# Patient Record
Sex: FEMALE | Race: BLACK OR AFRICAN AMERICAN | NOT HISPANIC OR LATINO | Employment: OTHER | URBAN - METROPOLITAN AREA
[De-identification: names, ages, dates, MRNs, and addresses within clinical notes are randomized per-mention and may not be internally consistent; named-entity substitution may affect disease eponyms.]

---

## 2024-07-08 ENCOUNTER — HOSPITAL ENCOUNTER (OUTPATIENT)
Facility: HOSPITAL | Age: 79
Setting detail: OBSERVATION
Discharge: HOME | End: 2024-07-08
Attending: EMERGENCY MEDICINE | Admitting: INTERNAL MEDICINE
Payer: MEDICARE

## 2024-07-08 ENCOUNTER — APPOINTMENT (OUTPATIENT)
Dept: DIALYSIS | Facility: HOSPITAL | Age: 79
End: 2024-07-08
Payer: MEDICARE

## 2024-07-08 ENCOUNTER — APPOINTMENT (OUTPATIENT)
Dept: CARDIOLOGY | Facility: HOSPITAL | Age: 79
End: 2024-07-08
Payer: MEDICARE

## 2024-07-08 VITALS
TEMPERATURE: 97.2 F | DIASTOLIC BLOOD PRESSURE: 62 MMHG | OXYGEN SATURATION: 97 % | WEIGHT: 150 LBS | BODY MASS INDEX: 26.58 KG/M2 | SYSTOLIC BLOOD PRESSURE: 153 MMHG | HEIGHT: 63 IN | RESPIRATION RATE: 16 BRPM | HEART RATE: 74 BPM

## 2024-07-08 DIAGNOSIS — E83.39 HYPERPHOSPHATEMIA: ICD-10-CM

## 2024-07-08 DIAGNOSIS — N17.9 ACUTE RENAL FAILURE SUPERIMPOSED ON CHRONIC KIDNEY DISEASE, ON CHRONIC DIALYSIS, UNSPECIFIED ACUTE RENAL FAILURE TYPE (MULTI): Primary | ICD-10-CM

## 2024-07-08 DIAGNOSIS — E87.5 HYPERKALEMIA: ICD-10-CM

## 2024-07-08 DIAGNOSIS — N18.6 ACUTE RENAL FAILURE SUPERIMPOSED ON CHRONIC KIDNEY DISEASE, ON CHRONIC DIALYSIS, UNSPECIFIED ACUTE RENAL FAILURE TYPE (MULTI): Primary | ICD-10-CM

## 2024-07-08 DIAGNOSIS — Z99.2 ACUTE RENAL FAILURE SUPERIMPOSED ON CHRONIC KIDNEY DISEASE, ON CHRONIC DIALYSIS, UNSPECIFIED ACUTE RENAL FAILURE TYPE (MULTI): Primary | ICD-10-CM

## 2024-07-08 PROBLEM — E87.6 HYPOKALEMIA: Status: ACTIVE | Noted: 2024-07-08

## 2024-07-08 PROBLEM — H47.291: Status: ACTIVE | Noted: 2024-07-08

## 2024-07-08 PROBLEM — H21.561 AFFERENT PUPILLARY DEFECT OF RIGHT EYE: Status: ACTIVE | Noted: 2024-07-08

## 2024-07-08 PROBLEM — M85.80 OSTEOPENIA WITH HIGH RISK OF FRACTURE: Status: ACTIVE | Noted: 2019-02-27

## 2024-07-08 PROBLEM — E11.3593 PROLIFERATIVE DIABETIC RETINOPATHY OF BOTH EYES ASSOCIATED WITH TYPE 2 DIABETES MELLITUS (MULTI): Status: ACTIVE | Noted: 2023-07-20

## 2024-07-08 PROBLEM — R06.09 DYSPNEA ON EXERTION: Status: ACTIVE | Noted: 2024-07-08

## 2024-07-08 PROBLEM — E11.65 TYPE 2 DIABETES MELLITUS WITH HYPERGLYCEMIA (MULTI): Status: ACTIVE | Noted: 2019-01-22

## 2024-07-08 PROBLEM — M51.36 DDD (DEGENERATIVE DISC DISEASE), LUMBAR: Status: ACTIVE | Noted: 2023-06-13

## 2024-07-08 PROBLEM — R79.89 INCREASED PTH LEVEL: Status: ACTIVE | Noted: 2020-11-03

## 2024-07-08 PROBLEM — Z96.1 PSEUDOPHAKIA, BOTH EYES: Status: ACTIVE | Noted: 2024-07-08

## 2024-07-08 PROBLEM — R29.898 LEG WEAKNESS: Status: ACTIVE | Noted: 2022-12-01

## 2024-07-08 PROBLEM — E55.9 VITAMIN D DEFICIENCY: Status: ACTIVE | Noted: 2024-07-08

## 2024-07-08 PROBLEM — R32 URINARY INCONTINENCE: Status: ACTIVE | Noted: 2018-12-04

## 2024-07-08 PROBLEM — F33.0 MILD RECURRENT MAJOR DEPRESSION (CMS-HCC): Status: ACTIVE | Noted: 2020-11-03

## 2024-07-08 PROBLEM — M51.369 DDD (DEGENERATIVE DISC DISEASE), LUMBAR: Status: ACTIVE | Noted: 2023-06-13

## 2024-07-08 PROBLEM — E11.3592: Status: ACTIVE | Noted: 2024-07-08

## 2024-07-08 PROBLEM — L29.9 ITCHY SKIN: Status: ACTIVE | Noted: 2022-04-05

## 2024-07-08 PROBLEM — N19 RENAL FAILURE: Status: ACTIVE | Noted: 2020-11-03

## 2024-07-08 PROBLEM — I10 BENIGN ESSENTIAL HYPERTENSION: Status: ACTIVE | Noted: 2024-02-07

## 2024-07-08 PROBLEM — R41.3 MEMORY LOSS: Status: ACTIVE | Noted: 2022-12-01

## 2024-07-08 PROBLEM — E78.5 HYPERLIPIDEMIA: Status: ACTIVE | Noted: 2024-07-08

## 2024-07-08 PROBLEM — R63.8 DIETARY INDISCRETION: Status: ACTIVE | Noted: 2019-01-22

## 2024-07-08 PROBLEM — R26.89 BALANCE DISORDER: Status: ACTIVE | Noted: 2022-12-01

## 2024-07-08 PROBLEM — E11.9 DIABETES MELLITUS (MULTI): Status: ACTIVE | Noted: 2024-07-08

## 2024-07-08 PROBLEM — N25.81 SECONDARY HYPERPARATHYROIDISM OF RENAL ORIGIN (MULTI): Status: ACTIVE | Noted: 2022-04-05

## 2024-07-08 PROBLEM — F03.90 DEMENTIA (MULTI): Status: ACTIVE | Noted: 2023-06-13

## 2024-07-08 PROBLEM — M48.061 SPINAL STENOSIS AT L4-L5 LEVEL: Status: ACTIVE | Noted: 2023-06-13

## 2024-07-08 PROBLEM — C90.00: Status: ACTIVE | Noted: 2019-12-04

## 2024-07-08 PROBLEM — E13.9 DIABETES 1.5, MANAGED AS TYPE 2 (MULTI): Chronic | Status: ACTIVE | Noted: 2024-02-07

## 2024-07-08 PROBLEM — R60.0 EDEMA LEG: Status: ACTIVE | Noted: 2020-11-04

## 2024-07-08 PROBLEM — D64.9 ANEMIA: Status: ACTIVE | Noted: 2019-10-29

## 2024-07-08 PROBLEM — H40.9 GLAUCOMA: Status: ACTIVE | Noted: 2024-07-08

## 2024-07-08 PROBLEM — Z91.199 MEDICAL NON-COMPLIANCE: Status: ACTIVE | Noted: 2020-11-03

## 2024-07-08 PROBLEM — I10 HYPERTENSION: Status: ACTIVE | Noted: 2024-07-08

## 2024-07-08 PROBLEM — N18.9 CKD (CHRONIC KIDNEY DISEASE): Status: ACTIVE | Noted: 2024-07-08

## 2024-07-08 PROBLEM — E11.319 DIABETIC RETINOPATHY (MULTI): Status: ACTIVE | Noted: 2021-09-29

## 2024-07-08 LAB
ALBUMIN SERPL BCP-MCNC: 3 G/DL (ref 3.4–5)
ALP SERPL-CCNC: 54 U/L (ref 33–136)
ALT SERPL W P-5'-P-CCNC: 11 U/L (ref 7–45)
ANION GAP BLDV CALCULATED.4IONS-SCNC: 14 MMOL/L (ref 10–25)
ANION GAP SERPL CALC-SCNC: 16 MMOL/L (ref 10–20)
AST SERPL W P-5'-P-CCNC: 14 U/L (ref 9–39)
BASE EXCESS BLDV CALC-SCNC: -4.6 MMOL/L (ref -2–3)
BASOPHILS # BLD AUTO: 0.02 X10*3/UL (ref 0–0.1)
BASOPHILS NFR BLD AUTO: 0.3 %
BILIRUB SERPL-MCNC: 0.5 MG/DL (ref 0–1.2)
BODY TEMPERATURE: 37 DEGREES CELSIUS
BUN SERPL-MCNC: 69 MG/DL (ref 6–23)
CA-I BLDV-SCNC: 1.15 MMOL/L (ref 1.1–1.33)
CALCIUM SERPL-MCNC: 8.3 MG/DL (ref 8.6–10.3)
CHLORIDE BLDV-SCNC: 107 MMOL/L (ref 98–107)
CHLORIDE SERPL-SCNC: 104 MMOL/L (ref 98–107)
CO2 SERPL-SCNC: 21 MMOL/L (ref 21–32)
CREAT SERPL-MCNC: 14.31 MG/DL (ref 0.5–1.05)
EGFRCR SERPLBLD CKD-EPI 2021: 2 ML/MIN/1.73M*2
EOSINOPHIL # BLD AUTO: 0.03 X10*3/UL (ref 0–0.4)
EOSINOPHIL NFR BLD AUTO: 0.4 %
ERYTHROCYTE [DISTWIDTH] IN BLOOD BY AUTOMATED COUNT: 16.3 % (ref 11.5–14.5)
GLUCOSE BLD MANUAL STRIP-MCNC: 83 MG/DL (ref 74–99)
GLUCOSE BLDV-MCNC: 86 MG/DL (ref 74–99)
GLUCOSE SERPL-MCNC: 79 MG/DL (ref 74–99)
HBV SURFACE AB SER-ACNC: >1000 MIU/ML
HBV SURFACE AG SERPL QL IA: NONREACTIVE
HCO3 BLDV-SCNC: 20.3 MMOL/L (ref 22–26)
HCT VFR BLD AUTO: 32.4 % (ref 36–46)
HCT VFR BLD EST: 35 % (ref 36–46)
HGB BLD-MCNC: 11.2 G/DL (ref 12–16)
HGB BLDV-MCNC: 11.6 G/DL (ref 12–16)
IMM GRANULOCYTES # BLD AUTO: 0.04 X10*3/UL (ref 0–0.5)
IMM GRANULOCYTES NFR BLD AUTO: 0.5 % (ref 0–0.9)
INHALED O2 CONCENTRATION: 21 %
INR PPP: 1 (ref 0.9–1.1)
LACTATE BLDV-SCNC: 1.1 MMOL/L (ref 0.4–2)
LYMPHOCYTES # BLD AUTO: 0.78 X10*3/UL (ref 0.8–3)
LYMPHOCYTES NFR BLD AUTO: 10.3 %
MAGNESIUM SERPL-MCNC: 2.3 MG/DL (ref 1.6–2.4)
MCH RBC QN AUTO: 29 PG (ref 26–34)
MCHC RBC AUTO-ENTMCNC: 34.6 G/DL (ref 32–36)
MCV RBC AUTO: 84 FL (ref 80–100)
MONOCYTES # BLD AUTO: 0.55 X10*3/UL (ref 0.05–0.8)
MONOCYTES NFR BLD AUTO: 7.3 %
NEUTROPHILS # BLD AUTO: 6.16 X10*3/UL (ref 1.6–5.5)
NEUTROPHILS NFR BLD AUTO: 81.2 %
NRBC BLD-RTO: 0 /100 WBCS (ref 0–0)
OXYHGB MFR BLDV: 85 % (ref 45–75)
PCO2 BLDV: 36 MM HG (ref 41–51)
PH BLDV: 7.36 PH (ref 7.33–7.43)
PHOSPHATE SERPL-MCNC: 7 MG/DL (ref 2.5–4.9)
PLATELET # BLD AUTO: 123 X10*3/UL (ref 150–450)
PO2 BLDV: 60 MM HG (ref 35–45)
POTASSIUM BLDV-SCNC: 6.7 MMOL/L (ref 3.5–5.3)
POTASSIUM SERPL-SCNC: 5.9 MMOL/L (ref 3.5–5.3)
PROT SERPL-MCNC: 8.8 G/DL (ref 6.4–8.2)
PROTHROMBIN TIME: 11.7 SECONDS (ref 9.8–12.8)
RBC # BLD AUTO: 3.86 X10*6/UL (ref 4–5.2)
SAO2 % BLDV: 87 % (ref 45–75)
SODIUM BLDV-SCNC: 135 MMOL/L (ref 136–145)
SODIUM SERPL-SCNC: 135 MMOL/L (ref 136–145)
WBC # BLD AUTO: 7.6 X10*3/UL (ref 4.4–11.3)

## 2024-07-08 PROCEDURE — 84132 ASSAY OF SERUM POTASSIUM: CPT | Mod: 59 | Performed by: EMERGENCY MEDICINE

## 2024-07-08 PROCEDURE — 80053 COMPREHEN METABOLIC PANEL: CPT | Performed by: PHYSICIAN ASSISTANT

## 2024-07-08 PROCEDURE — 36415 COLL VENOUS BLD VENIPUNCTURE: CPT | Performed by: PHYSICIAN ASSISTANT

## 2024-07-08 PROCEDURE — 85610 PROTHROMBIN TIME: CPT | Performed by: PHYSICIAN ASSISTANT

## 2024-07-08 PROCEDURE — 2500000004 HC RX 250 GENERAL PHARMACY W/ HCPCS (ALT 636 FOR OP/ED): Mod: JZ | Performed by: PHYSICIAN ASSISTANT

## 2024-07-08 PROCEDURE — 93005 ELECTROCARDIOGRAM TRACING: CPT

## 2024-07-08 PROCEDURE — 87340 HEPATITIS B SURFACE AG IA: CPT | Mod: AHULAB | Performed by: INTERNAL MEDICINE

## 2024-07-08 PROCEDURE — 82947 ASSAY GLUCOSE BLOOD QUANT: CPT

## 2024-07-08 PROCEDURE — 90937 HEMODIALYSIS REPEATED EVAL: CPT

## 2024-07-08 PROCEDURE — G0378 HOSPITAL OBSERVATION PER HR: HCPCS

## 2024-07-08 PROCEDURE — 8010000001 HC DIALYSIS - HEMODIALYSIS PER DAY

## 2024-07-08 PROCEDURE — 84100 ASSAY OF PHOSPHORUS: CPT | Performed by: PHYSICIAN ASSISTANT

## 2024-07-08 PROCEDURE — 96374 THER/PROPH/DIAG INJ IV PUSH: CPT | Mod: 59

## 2024-07-08 PROCEDURE — 99234 HOSP IP/OBS SM DT SF/LOW 45: CPT | Performed by: INTERNAL MEDICINE

## 2024-07-08 PROCEDURE — 36415 COLL VENOUS BLD VENIPUNCTURE: CPT | Performed by: EMERGENCY MEDICINE

## 2024-07-08 PROCEDURE — 85025 COMPLETE CBC W/AUTO DIFF WBC: CPT | Performed by: PHYSICIAN ASSISTANT

## 2024-07-08 PROCEDURE — 99285 EMERGENCY DEPT VISIT HI MDM: CPT | Mod: 25

## 2024-07-08 PROCEDURE — 86706 HEP B SURFACE ANTIBODY: CPT | Mod: AHULAB | Performed by: INTERNAL MEDICINE

## 2024-07-08 PROCEDURE — 83735 ASSAY OF MAGNESIUM: CPT | Performed by: PHYSICIAN ASSISTANT

## 2024-07-08 RX ORDER — ATORVASTATIN CALCIUM 80 MG/1
80 TABLET, FILM COATED ORAL NIGHTLY
Status: DISCONTINUED | OUTPATIENT
Start: 2024-07-08 | End: 2024-07-08 | Stop reason: HOSPADM

## 2024-07-08 RX ORDER — HEPARIN SODIUM 5000 [USP'U]/ML
5000 INJECTION, SOLUTION INTRAVENOUS; SUBCUTANEOUS EVERY 8 HOURS SCHEDULED
Status: DISCONTINUED | OUTPATIENT
Start: 2024-07-09 | End: 2024-07-08 | Stop reason: HOSPADM

## 2024-07-08 RX ORDER — ONDANSETRON 4 MG/1
4 TABLET, FILM COATED ORAL EVERY 8 HOURS PRN
Status: DISCONTINUED | OUTPATIENT
Start: 2024-07-08 | End: 2024-07-08 | Stop reason: HOSPADM

## 2024-07-08 RX ORDER — ACETAMINOPHEN 325 MG/1
975 TABLET ORAL EVERY 8 HOURS PRN
Status: DISCONTINUED | OUTPATIENT
Start: 2024-07-08 | End: 2024-07-08 | Stop reason: HOSPADM

## 2024-07-08 RX ORDER — NIFEDIPINE 30 MG/1
30 TABLET, FILM COATED, EXTENDED RELEASE ORAL
Status: DISCONTINUED | OUTPATIENT
Start: 2024-07-09 | End: 2024-07-08

## 2024-07-08 RX ORDER — ACETAMINOPHEN 650 MG/1
650 SUPPOSITORY RECTAL EVERY 4 HOURS PRN
Status: DISCONTINUED | OUTPATIENT
Start: 2024-07-08 | End: 2024-07-08 | Stop reason: HOSPADM

## 2024-07-08 RX ORDER — ATORVASTATIN CALCIUM 80 MG/1
80 TABLET, FILM COATED ORAL DAILY
COMMUNITY
Start: 2024-01-24

## 2024-07-08 RX ORDER — POLYETHYLENE GLYCOL 3350 17 G/17G
17 POWDER, FOR SOLUTION ORAL DAILY
Status: DISCONTINUED | OUTPATIENT
Start: 2024-07-08 | End: 2024-07-08 | Stop reason: HOSPADM

## 2024-07-08 RX ORDER — SEVELAMER CARBONATE 800 MG/1
800 TABLET, FILM COATED ORAL DAILY
COMMUNITY
Start: 2023-10-09

## 2024-07-08 RX ORDER — ONDANSETRON HYDROCHLORIDE 2 MG/ML
4 INJECTION, SOLUTION INTRAVENOUS EVERY 8 HOURS PRN
Status: DISCONTINUED | OUTPATIENT
Start: 2024-07-08 | End: 2024-07-08 | Stop reason: HOSPADM

## 2024-07-08 RX ORDER — ERGOCALCIFEROL 1.25 MG/1
CAPSULE ORAL
COMMUNITY
Start: 2015-01-12 | End: 2024-07-08 | Stop reason: ENTERED-IN-ERROR

## 2024-07-08 RX ORDER — HYDRALAZINE HYDROCHLORIDE 25 MG/1
100 TABLET, FILM COATED ORAL 2 TIMES DAILY
Status: DISCONTINUED | OUTPATIENT
Start: 2024-07-08 | End: 2024-07-08

## 2024-07-08 RX ORDER — NIFEDIPINE 30 MG/1
30 TABLET, FILM COATED, EXTENDED RELEASE ORAL DAILY
COMMUNITY
Start: 2024-06-27

## 2024-07-08 RX ORDER — CALCIUM GLUCONATE 20 MG/ML
2 INJECTION, SOLUTION INTRAVENOUS ONCE
Status: COMPLETED | OUTPATIENT
Start: 2024-07-08 | End: 2024-07-08

## 2024-07-08 RX ORDER — FERROUS SULFATE 325(65) MG
TABLET ORAL
COMMUNITY
Start: 2018-03-15 | End: 2024-07-08 | Stop reason: ENTERED-IN-ERROR

## 2024-07-08 RX ORDER — TALC
6 POWDER (GRAM) TOPICAL NIGHTLY
Status: DISCONTINUED | OUTPATIENT
Start: 2024-07-08 | End: 2024-07-08 | Stop reason: HOSPADM

## 2024-07-08 RX ORDER — ACETAMINOPHEN 160 MG/5ML
650 SOLUTION ORAL EVERY 6 HOURS PRN
Status: DISCONTINUED | OUTPATIENT
Start: 2024-07-08 | End: 2024-07-08 | Stop reason: HOSPADM

## 2024-07-08 RX ORDER — METOPROLOL SUCCINATE 50 MG/1
200 TABLET, EXTENDED RELEASE ORAL DAILY
Status: DISCONTINUED | OUTPATIENT
Start: 2024-07-08 | End: 2024-07-08

## 2024-07-08 RX ORDER — HYDRALAZINE HYDROCHLORIDE 100 MG/1
200 TABLET, FILM COATED ORAL DAILY
COMMUNITY
Start: 2024-06-05

## 2024-07-08 RX ORDER — METOPROLOL SUCCINATE 200 MG/1
200 TABLET, EXTENDED RELEASE ORAL DAILY
COMMUNITY
Start: 2024-06-27

## 2024-07-08 RX ORDER — ACETAMINOPHEN 500 MG
1-2 TABLET ORAL EVERY 6 HOURS PRN
COMMUNITY

## 2024-07-08 SDOH — SOCIAL STABILITY: SOCIAL INSECURITY: DO YOU FEEL ANYONE HAS EXPLOITED OR TAKEN ADVANTAGE OF YOU FINANCIALLY OR OF YOUR PERSONAL PROPERTY?: NO

## 2024-07-08 SDOH — SOCIAL STABILITY: SOCIAL INSECURITY: DO YOU FEEL UNSAFE GOING BACK TO THE PLACE WHERE YOU ARE LIVING?: NO

## 2024-07-08 SDOH — SOCIAL STABILITY: SOCIAL INSECURITY: HAS ANYONE EVER THREATENED TO HURT YOUR FAMILY OR YOUR PETS?: NO

## 2024-07-08 SDOH — SOCIAL STABILITY: SOCIAL INSECURITY: HAVE YOU HAD ANY THOUGHTS OF HARMING ANYONE ELSE?: NO

## 2024-07-08 SDOH — SOCIAL STABILITY: SOCIAL INSECURITY: HAVE YOU HAD THOUGHTS OF HARMING ANYONE ELSE?: NO

## 2024-07-08 SDOH — SOCIAL STABILITY: SOCIAL INSECURITY: ARE THERE ANY APPARENT SIGNS OF INJURIES/BEHAVIORS THAT COULD BE RELATED TO ABUSE/NEGLECT?: NO

## 2024-07-08 SDOH — SOCIAL STABILITY: SOCIAL INSECURITY: WERE YOU ABLE TO COMPLETE ALL THE BEHAVIORAL HEALTH SCREENINGS?: YES

## 2024-07-08 SDOH — SOCIAL STABILITY: SOCIAL INSECURITY: DOES ANYONE TRY TO KEEP YOU FROM HAVING/CONTACTING OTHER FRIENDS OR DOING THINGS OUTSIDE YOUR HOME?: NO

## 2024-07-08 SDOH — SOCIAL STABILITY: SOCIAL INSECURITY: ABUSE: ADULT

## 2024-07-08 SDOH — SOCIAL STABILITY: SOCIAL INSECURITY: ARE YOU OR HAVE YOU BEEN THREATENED OR ABUSED PHYSICALLY, EMOTIONALLY, OR SEXUALLY BY ANYONE?: NO

## 2024-07-08 ASSESSMENT — COGNITIVE AND FUNCTIONAL STATUS - GENERAL
DRESSING REGULAR LOWER BODY CLOTHING: A LITTLE
DAILY ACTIVITIY SCORE: 21
HELP NEEDED FOR BATHING: A LITTLE
DRESSING REGULAR UPPER BODY CLOTHING: A LITTLE
MOBILITY SCORE: 24
PATIENT BASELINE BEDBOUND: NO

## 2024-07-08 ASSESSMENT — ACTIVITIES OF DAILY LIVING (ADL)
PATIENT'S MEMORY ADEQUATE TO SAFELY COMPLETE DAILY ACTIVITIES?: YES
HEARING - RIGHT EAR: FUNCTIONAL
ADEQUATE_TO_COMPLETE_ADL: YES
DRESSING YOURSELF: NEEDS ASSISTANCE
HEARING - LEFT EAR: FUNCTIONAL
JUDGMENT_ADEQUATE_SAFELY_COMPLETE_DAILY_ACTIVITIES: YES
TOILETING: INDEPENDENT
LACK_OF_TRANSPORTATION: NO
ASSISTIVE_DEVICE: EYEGLASSES;WALKER
GROOMING: INDEPENDENT
FEEDING YOURSELF: INDEPENDENT
WALKS IN HOME: INDEPENDENT
BATHING: NEEDS ASSISTANCE

## 2024-07-08 ASSESSMENT — PAIN SCALES - GENERAL
PAINLEVEL_OUTOF10: 8
PAINLEVEL_OUTOF10: 0 - NO PAIN
PAINLEVEL_OUTOF10: 0 - NO PAIN

## 2024-07-08 ASSESSMENT — LIFESTYLE VARIABLES
AUDIT-C TOTAL SCORE: 1
SUBSTANCE_ABUSE_PAST_12_MONTHS: NO
HOW OFTEN DO YOU HAVE A DRINK CONTAINING ALCOHOL: MONTHLY OR LESS
AUDIT-C TOTAL SCORE: 1
SKIP TO QUESTIONS 9-10: 1
HOW OFTEN DO YOU HAVE 6 OR MORE DRINKS ON ONE OCCASION: NEVER
PRESCIPTION_ABUSE_PAST_12_MONTHS: NO
HOW MANY STANDARD DRINKS CONTAINING ALCOHOL DO YOU HAVE ON A TYPICAL DAY: 1 OR 2

## 2024-07-08 ASSESSMENT — PATIENT HEALTH QUESTIONNAIRE - PHQ9
1. LITTLE INTEREST OR PLEASURE IN DOING THINGS: SEVERAL DAYS
SUM OF ALL RESPONSES TO PHQ9 QUESTIONS 1 & 2: 2
2. FEELING DOWN, DEPRESSED OR HOPELESS: SEVERAL DAYS

## 2024-07-08 ASSESSMENT — COLUMBIA-SUICIDE SEVERITY RATING SCALE - C-SSRS
2. HAVE YOU ACTUALLY HAD ANY THOUGHTS OF KILLING YOURSELF?: NO
1. IN THE PAST MONTH, HAVE YOU WISHED YOU WERE DEAD OR WISHED YOU COULD GO TO SLEEP AND NOT WAKE UP?: NO
6. HAVE YOU EVER DONE ANYTHING, STARTED TO DO ANYTHING, OR PREPARED TO DO ANYTHING TO END YOUR LIFE?: NO

## 2024-07-08 ASSESSMENT — PAIN - FUNCTIONAL ASSESSMENT
PAIN_FUNCTIONAL_ASSESSMENT: NO/DENIES PAIN
PAIN_FUNCTIONAL_ASSESSMENT: 0-10
PAIN_FUNCTIONAL_ASSESSMENT: 0-10

## 2024-07-08 NOTE — ED TRIAGE NOTES
PT is A/Ox4 coming in for lab work. PT is supposed to start dialysis tomorrow for kidney disease and they are requiring blood work.PT has no complaints of pain or feelings of being unwell.

## 2024-07-08 NOTE — POST-PROCEDURE NOTE
..Report to Receiving RN:    Report To: Shira SALGADO  Time Report Called: [id number]  Hand-Off Communication: secure message  Complications During Treatment: No  Ultrafiltration Treatment: No  Medications Administered During Dialysis: No  Blood Products Administered During Dialysis: No  Labs Sent During Dialysis: No  Heparin Drip Rate Changes: No  Dialysis Catheter Dressing: n/a  Last Dressing Change: n/a    Electronic Signatures:   (Signed )   Authored:    (Signed )   Authored:     Last Updated: 5:46 PM by MANUEL LOVETT

## 2024-07-08 NOTE — PROGRESS NOTES
Pharmacy Medication History Review    Per patient.     Hilda Troy is a 79 y.o. female admitted for ESRD (end stage renal disease) ([facility]). Pharmacy reviewed the patient's [id number] medications and allergies for accuracy.    The list below reflectives the updated PTA list. Please review each medication in order reconciliation for additional clarification and justification.       The list below reflectives the updated allergy list. Please review each documented allergy for additional clarification and justification.  Allergies  Reviewed by Marlene Payne PA-C on 7/8/2024   No Known Allergies         Below are additional concerns with the patient's PTA list.  Prior to Admission Medications   Prescriptions Last Dose Informant   NIFEdipine ER (NIFEdipine XL) 30 mg 24 hr tablet 7/8/2024    Sig: Take 1 tablet (30 mg) by mouth once daily.   acetaminophen (Tylenol) 500 mg tablet     Sig: Take 1-2 tablets (500-1,000 mg) by mouth every 6 hours if needed for mild pain (1 - 3).   atorvastatin (Lipitor) 80 mg tablet 7/7/2024    Sig: Take 1 tablet (80 mg) by mouth once daily.   hydrALAZINE (Apresoline) 100 mg tablet 7/8/2024    Sig: Take 2 tablets (200 mg) by mouth once daily.   metoprolol succinate XL (Toprol-XL) 200 mg 24 hr tablet 7/8/2024    Sig: Take 1 tablet (200 mg) by mouth once daily.   sevelamer carbonate (Renvela) 800 mg tablet 7/8/2024    Sig: Take 1 tablet (800 mg) by mouth once daily.      Facility-Administered Medications: None        Mercedez Caballero

## 2024-07-08 NOTE — CARE PLAN
The patient's goals for the shift include receive dialysis treatment    The clinical goals for the shift include patient to remain hemodynamically stable      Problem: Pain - Adult  Goal: Verbalizes/displays adequate comfort level or baseline comfort level  Outcome: Progressing     Problem: Safety - Adult  Goal: Free from fall injury  Outcome: Progressing     Problem: Discharge Planning  Goal: Discharge to home or other facility with appropriate resources  Outcome: Progressing     Problem: Chronic Conditions and Co-morbidities  Goal: Patient's chronic conditions and co-morbidity symptoms are monitored and maintained or improved  Outcome: Progressing     Problem: Diabetes  Goal: Achieve decreasing blood glucose levels by end of shift  Outcome: Progressing  Goal: Increase stability of blood glucose readings by end of shift  Outcome: Progressing  Goal: Decrease in ketones present in urine by end of shift  Outcome: Progressing  Goal: Maintain electrolyte levels within acceptable range throughout shift  Outcome: Progressing  Goal: Maintain glucose levels >70mg/dl to <250mg/dl throughout shift  Outcome: Progressing  Goal: No changes in neurological exam by end of shift  Outcome: Progressing  Goal: Learn about and adhere to nutrition recommendations by end of shift  Outcome: Progressing  Goal: Vital signs within normal range for age by end of shift  Outcome: Progressing  Goal: Increase self care and/or family involovement by end of shift  Outcome: Progressing  Goal: Receive DSME education by end of shift  Outcome: Progressing

## 2024-07-08 NOTE — PROGRESS NOTES
Transitional Care Coordination Progress Note:  Plan per Medical/Surgical team: treatment of missed dialysis treatments, left arm fistula in place  Status: Observation  Payor source: medicare A/B  Discharge disposition: Home with daughter in [place]  Here for son's   Needs dialysis set up with [facility] in [place]  Potential Barriers: K 5.9,  renal 69/14.31  ADOD: 2024  L Micaela James RN, BSN Transitional Care Coordinator ED# [phone]     [facility]  [address] [id number]  [phone]  Fax: [phone].  [facility] Confirmed outpatient dialysis plan is //SAT @ 0800.     24 1159   Discharge Planning   Living Arrangements Children   Support Systems Children   Assistance Needed Needs dialysis set up with [facility] in [place]   Type of Residence Private residence   Do you have animals or pets at home? No   Home or Post Acute Services None   Patient expects to be discharged to: Home with daughter in [place]  Here for son's    Does the patient need discharge transport arranged? No   Financial Resource Strain   How hard is it for you to pay for the very basics like food, housing, medical care, and heating? Not hard   Housing Stability   In the last 12 months, was there a time when you were not able to pay the mortgage or rent on time? N   In the last 12 months, how many places have you lived? 1   In the last 12 months, was there a time when you did not have a steady place to sleep or slept in a shelter (including now)? N   Transportation Needs   In the past 12 months, has lack of transportation kept you from medical appointments or from getting medications? no   In the past 12 months, has lack of transportation kept you from meetings, work, or from getting things needed for daily living? No

## 2024-07-08 NOTE — NURSING NOTE
Report from Sending RN:    Report From: Charlotte Smith  Recent Surgery of Procedure: No  Baseline Level of Consciousness (LOC): A&O X 3  Oxygen Use: Yes  Type: Room air  Diabetic: Yes  Last BP Med Given Day of Dialysis: see EMAR  Last Pain Med Given: see EMAR  Lab Tests to be Obtained with Dialysis: No  Blood Transfusion to be Given During Dialysis: No  Available IV Access: Yes  Medications to be Administered During Dialysis: No  Continuous IV Infusion Running: No  Restraints on Currently or in the Last 24 Hours: No  Hand-Off Communication: Patient chronic ESRD from [place] who is in town for a  but chose to not go to center in [place] that was arranged for her.  Last dialysis was on the .  Dialysis Catheter Dressing: N/A  Last Dressing Change: N/A

## 2024-07-08 NOTE — DISCHARGE INSTR - APPOINTMENTS
[address], [place] [address]  [phone]  Fax: [phone]  outpatient dialysis plan is T/TH/SAT @ 0800

## 2024-07-08 NOTE — PROGRESS NOTES
07/08/24 1159   [facility] Disability Status   Are you deaf or do you have serious difficulty hearing? N   Are you blind or do you have serious difficulty seeing, even when wearing glasses? N   Because of a physical, mental, or emotional condition, do you have serious difficulty concentrating, remembering, or making decisions? (5 years old or older) N   Do you have serious difficulty walking or climbing stairs? Y  (rollator)   Do you have serious difficulty dressing or bathing? N   Because of a physical, mental, or emotional condition, do you have serious difficulty doing errands alone such as visiting the doctor? Y

## 2024-07-08 NOTE — PROGRESS NOTES
Home with daughter in [place]     07/08/24 7794   Current Planned Discharge Disposition   Current Planned Discharge Disposition Home

## 2024-07-08 NOTE — ED PROVIDER NOTES
Chief Complaint   Patient presents with    Labs Only     HPI:   Hilda Troy is an 79 y.o. female with complicated PMH including ESRD (HD M/W/F), HTN, HLD, anemia, vitamin deficiency, diet-controlled T2DM who presents to the ED with her daughter to obtain lab work.  Patient is from [place] and recently her son passed away.  She is in [place] for the  and is planning on staying for the next few weeks.  She is trying to establish dialysis in [place] because she has not been dialyzed since .  She spoke to [facility] who told her they could start dialysis tomorrow but she needed to obtain lab work first.  They did not place any outpatient orders for labs.  Because she does not have a doctor here she presented to the ED because she did not know where else to go.  Patient admits to some shortness of breath with exertion but otherwise denies any chest pain, palpitations, abdominal pain, nausea, vomiting, fever, chills, dizziness or lightheadedness.  She is having some swelling of her right ankle but otherwise no leg swelling or calf pain.  No recent falls or injuries.    Medications: Atorvastatin, vitamin D, iron, metoprolol, nifedipine, hydralazine  Soc HX:  No Known Allergies: NKDA  Past Medical History:   Diagnosis Date    Central retinal vein occlusion of right eye (CMS-HCC)     CRVO (central retinal vein occlusion), right    Maternal care for other specified fetal problems, unspecified trimester, fetus 5 ([facility]) 10/02/2015    Antepartum fetal acidosis, unspecified trimester, fetus 5    Maternal care for other specified fetal problems, unspecified trimester, fetus 5 ([facility]) 10/02/2015    Antepartum fetal acidosis, unspecified trimester, fetus 5    Presence of intraocular lens 10/02/2015    Pseudophakia of left eye    Presence of intraocular lens 10/02/2015    Pseudophakia of left eye    Unspecified cataract     Cataract, right eye     Past Surgical History:   Procedure Laterality Date     OTHER SURGICAL HISTORY  10/02/2015    Extracaps Cataract Extract With Prosthesis Insert Left Eye    OTHER SURGICAL HISTORY  10/02/2015    Proliferative Retinopathy Pan-Retinal Ablation Performed     No family history on file.     Physical Exam  Vitals and nursing note reviewed.   Constitutional:       General: She is not in acute distress.     Appearance: Normal appearance. She is not ill-appearing or toxic-appearing.   HENT:      Right Ear: External ear normal.      Left Ear: External ear normal.   Eyes:      Pupils: Pupils are equal, round, and reactive to light.      Comments: Bilateral cataract   Cardiovascular:      Rate and Rhythm: Normal rate and regular rhythm.      Pulses: Normal pulses.      Heart sounds: Normal heart sounds.   Pulmonary:      Effort: Pulmonary effort is normal.      Breath sounds: Normal breath sounds.   Abdominal:      General: Bowel sounds are normal. There is distension.      Palpations: Abdomen is soft.      Tenderness: There is no abdominal tenderness. There is no guarding or rebound.   Musculoskeletal:         General: Normal range of motion.   Skin:     General: Skin is warm and dry.   Neurological:      Mental Status: She is alert.      Cranial Nerves: No cranial nerve deficit.     VS: As documented in the triage note and EMR flowsheet from this visit were reviewed.    External Records Reviewed: I reviewed recent and relevant outside records including: Reviewed labs from 626.  Patient hemoglobin 10.3.    EKG INTERPRETATION:      Personally Reviewed      Rhythm:  Normal sinus rhythm      Rate: 81 bpm      Axis:  LAD     Intervals:  Prolonged  ms     QRS Complex:  Normal      ST Segment: No ST segment abnormalities or hyperacute T waves     QT Interval: QTc 466 ms      Compared with Prior: No MUSE       Medical Decision Making:   ED Course as of 07/08/24 1149   Mon Jul 08, 2024   0920 Vitals Reviewed: Afebrile. Hypertensive. Not tachycardic nor tachypneic. No hypoxia.   [KA]    7973 Patient is a pleasant 79-year-old female who presents to the ED for lab work and because she is not been dialyzed in a few weeks.  She has mildly distended abdomen, breath sounds are diminished at bilateral bases and she has subtle right ankle swelling otherwise physical exam unremarkable.  Will obtain labs including phosphorus.  Discussed with patient that likely she may need admission or dialysis today.  Pending workup. [KA]   1118 I personally reviewed labs.  CBC shows normocytic anemia with hemoglobin 11.2, similar to baseline.  Mild thrombocytopenia.  Magnesium normal.  Coags normal.  Phosphorus markedly elevated 7.0, baseline 3.9 on 5/28.  CMP shows creatinine 14.31, BUN of 69 with GFR of 2.  Creatinine on 5/28 was 7.56.  Hyperkalemia with potassium 5.9.  Have ordered IV calcium gluconate.  Discussed patient's case with hospitalist and have reached out to nephrologist to schedule urgent dialysis. [KA]   1144 Reached out to Dr. Ellis, nephrology regarding patient need for dialysis and he is working getting her scheduled.  Spoke with hospitalist Dr. Fuentes who has agreed to accept patient to his care.  Patient agreeable to admission. [KA]      ED Course User Index  [KA] Marlene Payne PA-C         Diagnoses as of 07/08/24 1149   Hyperkalemia   Acute renal failure superimposed on chronic kidney disease, on chronic dialysis, unspecified acute renal failure type (Multi)   Hyperphosphatemia     Chronic Medical Conditions Significantly Affecting Care: ESRD     Escalation of Care: Appropriate for hospitalization       Discussion of Management with Other Providers:  I discussed the patient/results with: Attending Dr. Villatoro, nephrologist Dr. Ellis, hospitalist Dr. Fuentes    Counseling: Spoke with the patient and discussed today´s findings, in addition to providing specific details for the plan of care and expected course.  Patient was given the opportunity to ask questions.  Educated on the common potential  side effects of medications prescribed.    The plan of care was mutually agreed upon with the patient. The patient and/or family were given the opportunity to ask questions. All questions asked today in the ED were answered to the best of my ability with today's information.    This patient was cared for in the setting of nationwide stress on resources and staffing.    This report was transcribed using voice recognition software.  Every effort was made to ensure accuracy, however, inadvertently computerized transcription errors may be present.       Marlene Payne PA-C  07/08/24 1144

## 2024-07-08 NOTE — PROCEDURES
"Patient seen on dialysis. Ms. Troy is visiting from [facility] due to the passing of her son. She dialyzes at [facility] in [place] and was last dialyzed on 24. She was scheduled to dialyze at [facility] on  albeit this was the day of her sons . She presented to the ED for evaluation. She is currently dialyzing on an F160 kidney, BFR/DFR of 350/600 ml/min, 2 k bath with a target UF set for 1.5 L as tolerated. She is tolerating dialysis. Continue per submitted orders. She will [facility] [place] until she returns to [place].     /63 (BP Location: Right arm, Patient Position: Lying)   Pulse 78   Temp 36.7 °C (98.1 °F) (Temporal)   Resp 16   Ht 1.6 m (5' 3\")   Wt 68 kg (150 lb)   SpO2 98%   BMI 26.57 kg/m²     "

## 2024-07-09 NOTE — H&P
"History Of Present Illness  Hilda Troy is a 79 y.o. female presenting with abnormal labs.  Past Medical History:   Diagnosis Date    Central retinal vein occlusion of right eye (CMS-HCC)     CRVO (central retinal vein occlusion), right    Maternal care for other specified fetal problems, unspecified trimester, fetus 5 ([facility]) 10/02/2015    Antepartum fetal acidosis, unspecified trimester, fetus 5    Maternal care for other specified fetal problems, unspecified trimester, fetus 5 ([facility]) 10/02/2015    Antepartum fetal acidosis, unspecified trimester, fetus 5    Presence of intraocular lens 10/02/2015    Pseudophakia of left eye    Presence of intraocular lens 10/02/2015    Pseudophakia of left eye    Unspecified cataract     Cataract, right eye     She lives in [place]. Is here for a few weeks for a death in the family. She planned to do temporary dialysis while here beginning tomorrow; they asked that she get blood-work today. BMP shows K of 5.9, so advised to come to ED. Her last IHD session was the week before last. She denies any complaints.     Past Surgical History:   Procedure Laterality Date    OTHER SURGICAL HISTORY  10/02/2015    Extracaps Cataract Extract With Prosthesis Insert Left Eye    OTHER SURGICAL HISTORY  10/02/2015    Proliferative Retinopathy Pan-Retinal Ablation Performed     Social History     Tobacco Use    Smoking status: Never    Smokeless tobacco: Never   Vaping Use    Vaping status: Never Used   Substance Use Topics    Alcohol use: Yes    Drug use: Never     No family history on file.  Allergies  Patient has no known allergies.    Review of Systems   All other systems reviewed and are negative.      Last Recorded Vitals  Blood pressure 153/62, pulse 74, temperature 36.2 °C (97.2 °F), temperature source Temporal, resp. rate 16, height 1.6 m (5' 3\"), weight 68 kg (150 lb), SpO2 97%.  Physical Exam  Cardiovascular:      Rate and Rhythm: Normal rate and regular rhythm.      Heart " sounds: Normal heart sounds.   Pulmonary:      Breath sounds: Normal breath sounds.   Abdominal:      General: Bowel sounds are normal.      Palpations: Abdomen is soft.   Musculoskeletal:         General: Normal range of motion.   Neurological:      General: No focal deficit present.      Mental Status: She is alert and oriented to person, place, and time.   Psychiatric:         Mood and Affect: Mood normal.           Relevant Results           Assessment/Plan   Principal Problem:    ESRD (end stage renal disease) (Multi)  - had IHD session today here and went home  - can follow up at IHD unit tomorrow         I spent 45 minutes in the professional and overall care of this patient.      Manav Fuentes MD

## 2024-07-14 LAB
ATRIAL RATE: 81 BPM
P AXIS: 69 DEGREES
P OFFSET: 161 MS
P ONSET: 117 MS
PR INTERVAL: 202 MS
Q ONSET: 218 MS
QRS COUNT: 14 BEATS
QRS DURATION: 76 MS
QT INTERVAL: 402 MS
QTC CALCULATION(BAZETT): 466 MS
QTC FREDERICIA: 444 MS
R AXIS: -36 DEGREES
T AXIS: 31 DEGREES
T OFFSET: 419 MS
VENTRICULAR RATE: 81 BPM

## 2024-12-07 ENCOUNTER — APPOINTMENT (OUTPATIENT)
Dept: CARDIOLOGY | Facility: HOSPITAL | Age: 79
DRG: 640 | End: 2024-12-07
Payer: MEDICARE

## 2024-12-07 ENCOUNTER — APPOINTMENT (OUTPATIENT)
Dept: RADIOLOGY | Facility: HOSPITAL | Age: 79
DRG: 640 | End: 2024-12-07
Payer: MEDICARE

## 2024-12-07 ENCOUNTER — HOSPITAL ENCOUNTER (INPATIENT)
Facility: HOSPITAL | Age: 79
LOS: 2 days | Discharge: HOSPICE | DRG: 640 | End: 2024-12-09
Attending: EMERGENCY MEDICINE | Admitting: SURGERY
Payer: MEDICARE

## 2024-12-07 DIAGNOSIS — R41.82 ALTERED MENTAL STATUS, UNSPECIFIED ALTERED MENTAL STATUS TYPE: Primary | ICD-10-CM

## 2024-12-07 DIAGNOSIS — N19 UREMIA: ICD-10-CM

## 2024-12-07 DIAGNOSIS — E87.5 HYPERKALEMIA: ICD-10-CM

## 2024-12-07 LAB
ALBUMIN SERPL BCP-MCNC: 3 G/DL (ref 3.4–5)
ALP SERPL-CCNC: 44 U/L (ref 33–136)
ALT SERPL W P-5'-P-CCNC: 7 U/L (ref 7–45)
AMMONIA PLAS-SCNC: 25 UMOL/L (ref 16–53)
ANION GAP BLDV CALCULATED.4IONS-SCNC: 19 MMOL/L (ref 10–25)
ANION GAP SERPL CALC-SCNC: 22 MMOL/L (ref 10–20)
APAP SERPL-MCNC: <10 UG/ML
AST SERPL W P-5'-P-CCNC: 14 U/L (ref 9–39)
BASE EXCESS BLDV CALC-SCNC: -6.9 MMOL/L (ref -2–3)
BASE EXCESS BLDV CALC-SCNC: -7.9 MMOL/L (ref -2–3)
BASE EXCESS BLDV CALC-SCNC: -9.3 MMOL/L (ref -2–3)
BASOPHILS # BLD AUTO: 0.01 X10*3/UL (ref 0–0.1)
BASOPHILS NFR BLD AUTO: 0.1 %
BILIRUB SERPL-MCNC: 0.5 MG/DL (ref 0–1.2)
BNP SERPL-MCNC: 542 PG/ML (ref 0–99)
BODY TEMPERATURE: 37 DEGREES CELSIUS
BUN SERPL-MCNC: 86 MG/DL (ref 6–23)
CA-I BLDV-SCNC: 1.31 MMOL/L (ref 1.1–1.33)
CA-I BLDV-SCNC: 1.4 MMOL/L (ref 1.1–1.33)
CA-I BLDV-SCNC: 1.44 MMOL/L (ref 1.1–1.33)
CALCIUM SERPL-MCNC: 9.7 MG/DL (ref 8.6–10.3)
CARDIAC TROPONIN I PNL SERPL HS: 257 NG/L (ref 0–13)
CARDIAC TROPONIN I PNL SERPL HS: 269 NG/L (ref 0–13)
CHLORIDE BLDV-SCNC: 114 MMOL/L (ref 98–107)
CHLORIDE BLDV-SCNC: 115 MMOL/L (ref 98–107)
CHLORIDE BLDV-SCNC: 115 MMOL/L (ref 98–107)
CHLORIDE SERPL-SCNC: 113 MMOL/L (ref 98–107)
CO2 SERPL-SCNC: 18 MMOL/L (ref 21–32)
CREAT SERPL-MCNC: 19.9 MG/DL (ref 0.5–1.05)
EGFRCR SERPLBLD CKD-EPI 2021: 2 ML/MIN/1.73M*2
EOSINOPHIL # BLD AUTO: 0.03 X10*3/UL (ref 0–0.4)
EOSINOPHIL NFR BLD AUTO: 0.3 %
ERYTHROCYTE [DISTWIDTH] IN BLOOD BY AUTOMATED COUNT: 17.2 % (ref 11.5–14.5)
ETHANOL SERPL-MCNC: <10 MG/DL
FLUAV RNA RESP QL NAA+PROBE: NOT DETECTED
FLUBV RNA RESP QL NAA+PROBE: NOT DETECTED
GLUCOSE BLD MANUAL STRIP-MCNC: 105 MG/DL (ref 74–99)
GLUCOSE BLD MANUAL STRIP-MCNC: 143 MG/DL (ref 74–99)
GLUCOSE BLDV-MCNC: 111 MG/DL (ref 74–99)
GLUCOSE BLDV-MCNC: 183 MG/DL (ref 74–99)
GLUCOSE BLDV-MCNC: 261 MG/DL (ref 74–99)
GLUCOSE SERPL-MCNC: 101 MG/DL (ref 74–99)
HCO3 BLDV-SCNC: 17.6 MMOL/L (ref 22–26)
HCO3 BLDV-SCNC: 18.8 MMOL/L (ref 22–26)
HCO3 BLDV-SCNC: 19.2 MMOL/L (ref 22–26)
HCT VFR BLD AUTO: 34.6 % (ref 36–46)
HCT VFR BLD EST: 35 % (ref 36–46)
HCT VFR BLD EST: 36 % (ref 36–46)
HCT VFR BLD EST: 37 % (ref 36–46)
HGB BLD-MCNC: 11.4 G/DL (ref 12–16)
HGB BLDV-MCNC: 11.5 G/DL (ref 12–16)
HGB BLDV-MCNC: 12 G/DL (ref 12–16)
HGB BLDV-MCNC: 12.2 G/DL (ref 12–16)
IMM GRANULOCYTES # BLD AUTO: 0.04 X10*3/UL (ref 0–0.5)
IMM GRANULOCYTES NFR BLD AUTO: 0.4 % (ref 0–0.9)
INHALED O2 CONCENTRATION: 21 %
LACTATE BLDV-SCNC: 1.7 MMOL/L (ref 0.4–2)
LACTATE BLDV-SCNC: 2.4 MMOL/L (ref 0.4–2)
LACTATE BLDV-SCNC: 3.8 MMOL/L (ref 0.4–2)
LYMPHOCYTES # BLD AUTO: 1.01 X10*3/UL (ref 0.8–3)
LYMPHOCYTES NFR BLD AUTO: 10.4 %
MAGNESIUM SERPL-MCNC: 2.74 MG/DL (ref 1.6–2.4)
MCH RBC QN AUTO: 27.9 PG (ref 26–34)
MCHC RBC AUTO-ENTMCNC: 32.9 G/DL (ref 32–36)
MCV RBC AUTO: 85 FL (ref 80–100)
MONOCYTES # BLD AUTO: 0.42 X10*3/UL (ref 0.05–0.8)
MONOCYTES NFR BLD AUTO: 4.3 %
NEUTROPHILS # BLD AUTO: 8.24 X10*3/UL (ref 1.6–5.5)
NEUTROPHILS NFR BLD AUTO: 84.5 %
NRBC BLD-RTO: 0 /100 WBCS (ref 0–0)
OXYHGB MFR BLDV: 54.8 % (ref 45–75)
OXYHGB MFR BLDV: 59.3 % (ref 45–75)
OXYHGB MFR BLDV: 67.4 % (ref 45–75)
PCO2 BLDV: 40 MM HG (ref 41–51)
PCO2 BLDV: 41 MM HG (ref 41–51)
PCO2 BLDV: 42 MM HG (ref 41–51)
PH BLDV: 7.24 PH (ref 7.33–7.43)
PH BLDV: 7.26 PH (ref 7.33–7.43)
PH BLDV: 7.29 PH (ref 7.33–7.43)
PLATELET # BLD AUTO: 225 X10*3/UL (ref 150–450)
PO2 BLDV: 37 MM HG (ref 35–45)
PO2 BLDV: 38 MM HG (ref 35–45)
PO2 BLDV: 44 MM HG (ref 35–45)
POTASSIUM BLDV-SCNC: 5.5 MMOL/L (ref 3.5–5.3)
POTASSIUM BLDV-SCNC: 6.1 MMOL/L (ref 3.5–5.3)
POTASSIUM BLDV-SCNC: 7.3 MMOL/L (ref 3.5–5.3)
POTASSIUM SERPL-SCNC: 6.9 MMOL/L (ref 3.5–5.3)
PROT SERPL-MCNC: 8.6 G/DL (ref 6.4–8.2)
RBC # BLD AUTO: 4.09 X10*6/UL (ref 4–5.2)
SALICYLATES SERPL-MCNC: <3 MG/DL
SAO2 % BLDV: 56 % (ref 45–75)
SAO2 % BLDV: 61 % (ref 45–75)
SAO2 % BLDV: 69 % (ref 45–75)
SARS-COV-2 RNA RESP QL NAA+PROBE: NOT DETECTED
SODIUM BLDV-SCNC: 145 MMOL/L (ref 136–145)
SODIUM BLDV-SCNC: 146 MMOL/L (ref 136–145)
SODIUM BLDV-SCNC: 147 MMOL/L (ref 136–145)
SODIUM SERPL-SCNC: 146 MMOL/L (ref 136–145)
WBC # BLD AUTO: 9.8 X10*3/UL (ref 4.4–11.3)

## 2024-12-07 PROCEDURE — 80320 DRUG SCREEN QUANTALCOHOLS: CPT

## 2024-12-07 PROCEDURE — 82947 ASSAY GLUCOSE BLOOD QUANT: CPT

## 2024-12-07 PROCEDURE — 2500000004 HC RX 250 GENERAL PHARMACY W/ HCPCS (ALT 636 FOR OP/ED): Performed by: SURGERY

## 2024-12-07 PROCEDURE — 2500000005 HC RX 250 GENERAL PHARMACY W/O HCPCS

## 2024-12-07 PROCEDURE — 71260 CT THORAX DX C+: CPT

## 2024-12-07 PROCEDURE — 96367 TX/PROPH/DG ADDL SEQ IV INF: CPT

## 2024-12-07 PROCEDURE — 96376 TX/PRO/DX INJ SAME DRUG ADON: CPT

## 2024-12-07 PROCEDURE — 70450 CT HEAD/BRAIN W/O DYE: CPT

## 2024-12-07 PROCEDURE — 72125 CT NECK SPINE W/O DYE: CPT | Performed by: RADIOLOGY

## 2024-12-07 PROCEDURE — 84132 ASSAY OF SERUM POTASSIUM: CPT | Performed by: EMERGENCY MEDICINE

## 2024-12-07 PROCEDURE — 83880 ASSAY OF NATRIURETIC PEPTIDE: CPT

## 2024-12-07 PROCEDURE — 84484 ASSAY OF TROPONIN QUANT: CPT

## 2024-12-07 PROCEDURE — 84132 ASSAY OF SERUM POTASSIUM: CPT

## 2024-12-07 PROCEDURE — 74177 CT ABD & PELVIS W/CONTRAST: CPT | Performed by: RADIOLOGY

## 2024-12-07 PROCEDURE — 2500000004 HC RX 250 GENERAL PHARMACY W/ HCPCS (ALT 636 FOR OP/ED): Performed by: EMERGENCY MEDICINE

## 2024-12-07 PROCEDURE — 82140 ASSAY OF AMMONIA: CPT

## 2024-12-07 PROCEDURE — 96375 TX/PRO/DX INJ NEW DRUG ADDON: CPT

## 2024-12-07 PROCEDURE — 71045 X-RAY EXAM CHEST 1 VIEW: CPT

## 2024-12-07 PROCEDURE — 2500000002 HC RX 250 W HCPCS SELF ADMINISTERED DRUGS (ALT 637 FOR MEDICARE OP, ALT 636 FOR OP/ED)

## 2024-12-07 PROCEDURE — 85025 COMPLETE CBC W/AUTO DIFF WBC: CPT

## 2024-12-07 PROCEDURE — 36415 COLL VENOUS BLD VENIPUNCTURE: CPT | Performed by: EMERGENCY MEDICINE

## 2024-12-07 PROCEDURE — 2020000001 HC ICU ROOM DAILY

## 2024-12-07 PROCEDURE — 96365 THER/PROPH/DIAG IV INF INIT: CPT

## 2024-12-07 PROCEDURE — 72125 CT NECK SPINE W/O DYE: CPT

## 2024-12-07 PROCEDURE — 71260 CT THORAX DX C+: CPT | Performed by: RADIOLOGY

## 2024-12-07 PROCEDURE — 94640 AIRWAY INHALATION TREATMENT: CPT

## 2024-12-07 PROCEDURE — 93005 ELECTROCARDIOGRAM TRACING: CPT

## 2024-12-07 PROCEDURE — 83735 ASSAY OF MAGNESIUM: CPT

## 2024-12-07 PROCEDURE — 87040 BLOOD CULTURE FOR BACTERIA: CPT | Mod: AHULAB | Performed by: EMERGENCY MEDICINE

## 2024-12-07 PROCEDURE — 99291 CRITICAL CARE FIRST HOUR: CPT | Mod: 25 | Performed by: EMERGENCY MEDICINE

## 2024-12-07 PROCEDURE — 70450 CT HEAD/BRAIN W/O DYE: CPT | Performed by: RADIOLOGY

## 2024-12-07 PROCEDURE — 2550000001 HC RX 255 CONTRASTS: Performed by: EMERGENCY MEDICINE

## 2024-12-07 PROCEDURE — 72170 X-RAY EXAM OF PELVIS: CPT

## 2024-12-07 PROCEDURE — 87636 SARSCOV2 & INF A&B AMP PRB: CPT

## 2024-12-07 RX ORDER — NIFEDIPINE 30 MG/1
30 TABLET, FILM COATED, EXTENDED RELEASE ORAL DAILY
Status: DISCONTINUED | OUTPATIENT
Start: 2024-12-08 | End: 2024-12-08

## 2024-12-07 RX ORDER — DEXTROSE 50 % IN WATER (D50W) INTRAVENOUS SYRINGE
25 ONCE
Status: COMPLETED | OUTPATIENT
Start: 2024-12-07 | End: 2024-12-07

## 2024-12-07 RX ORDER — INSULIN LISPRO 100 [IU]/ML
0-10 INJECTION, SOLUTION INTRAVENOUS; SUBCUTANEOUS
Status: DISCONTINUED | OUTPATIENT
Start: 2024-12-08 | End: 2024-12-08

## 2024-12-07 RX ORDER — ATORVASTATIN CALCIUM 80 MG/1
80 TABLET, FILM COATED ORAL DAILY
Status: DISCONTINUED | OUTPATIENT
Start: 2024-12-08 | End: 2024-12-08

## 2024-12-07 RX ORDER — HYDRALAZINE HYDROCHLORIDE 50 MG/1
200 TABLET, FILM COATED ORAL DAILY
Status: DISCONTINUED | OUTPATIENT
Start: 2024-12-08 | End: 2024-12-08

## 2024-12-07 RX ORDER — DEXTROSE 50 % IN WATER (D50W) INTRAVENOUS SYRINGE
12.5
Status: DISCONTINUED | OUTPATIENT
Start: 2024-12-07 | End: 2024-12-10 | Stop reason: HOSPADM

## 2024-12-07 RX ORDER — VANCOMYCIN HYDROCHLORIDE 1 G/20ML
INJECTION, POWDER, LYOPHILIZED, FOR SOLUTION INTRAVENOUS DAILY PRN
Status: DISCONTINUED | OUTPATIENT
Start: 2024-12-07 | End: 2024-12-07

## 2024-12-07 RX ORDER — HEPARIN SODIUM 5000 [USP'U]/ML
5000 INJECTION, SOLUTION INTRAVENOUS; SUBCUTANEOUS EVERY 8 HOURS SCHEDULED
Status: DISCONTINUED | OUTPATIENT
Start: 2024-12-07 | End: 2024-12-08

## 2024-12-07 RX ORDER — SEVELAMER CARBONATE 800 MG/1
800 TABLET, FILM COATED ORAL DAILY
Status: DISCONTINUED | OUTPATIENT
Start: 2024-12-08 | End: 2024-12-10 | Stop reason: HOSPADM

## 2024-12-07 RX ORDER — CALCIUM GLUCONATE 20 MG/ML
2 INJECTION, SOLUTION INTRAVENOUS ONCE
Status: COMPLETED | OUTPATIENT
Start: 2024-12-07 | End: 2024-12-07

## 2024-12-07 RX ORDER — ALBUTEROL SULFATE 0.83 MG/ML
10 SOLUTION RESPIRATORY (INHALATION) ONCE
Status: COMPLETED | OUTPATIENT
Start: 2024-12-07 | End: 2024-12-07

## 2024-12-07 RX ORDER — VANCOMYCIN HYDROCHLORIDE 1 G/200ML
1000 INJECTION, SOLUTION INTRAVENOUS ONCE
Status: COMPLETED | OUTPATIENT
Start: 2024-12-07 | End: 2024-12-07

## 2024-12-07 RX ORDER — METOPROLOL SUCCINATE 50 MG/1
200 TABLET, EXTENDED RELEASE ORAL DAILY
Status: DISCONTINUED | OUTPATIENT
Start: 2024-12-08 | End: 2024-12-08

## 2024-12-07 RX ADMIN — PIPERACILLIN SODIUM AND TAZOBACTAM SODIUM 2.25 G: 2; .25 INJECTION, SOLUTION INTRAVENOUS at 17:00

## 2024-12-07 RX ADMIN — IOHEXOL 90 ML: 350 INJECTION, SOLUTION INTRAVENOUS at 17:10

## 2024-12-07 RX ADMIN — HEPARIN SODIUM 5000 UNITS: 5000 INJECTION, SOLUTION INTRAVENOUS; SUBCUTANEOUS at 22:44

## 2024-12-07 RX ADMIN — INSULIN HUMAN 5 UNITS: 100 INJECTION, SOLUTION PARENTERAL at 14:28

## 2024-12-07 RX ADMIN — ALBUTEROL SULFATE 10 MG: 2.5 SOLUTION RESPIRATORY (INHALATION) at 14:25

## 2024-12-07 RX ADMIN — CALCIUM GLUCONATE 2 G: 20 INJECTION, SOLUTION INTRAVENOUS at 14:11

## 2024-12-07 RX ADMIN — DEXTROSE MONOHYDRATE 25 G: 25 INJECTION, SOLUTION INTRAVENOUS at 15:21

## 2024-12-07 RX ADMIN — DEXTROSE MONOHYDRATE 25 G: 25 INJECTION, SOLUTION INTRAVENOUS at 14:27

## 2024-12-07 RX ADMIN — VANCOMYCIN HYDROCHLORIDE 1000 MG: 1 INJECTION, SOLUTION INTRAVENOUS at 18:22

## 2024-12-07 RX ADMIN — INSULIN HUMAN 5 UNITS: 100 INJECTION, SOLUTION PARENTERAL at 15:21

## 2024-12-07 ASSESSMENT — PAIN SCALES - PAIN ASSESSMENT IN ADVANCED DEMENTIA (PAINAD)
NEGVOCALIZATION: OCCASIONAL MOAN/GROAN, LOW SPEECH, NEGATIVE/DISAPPROVING QUALITY
FACIALEXPRESSION: SAD, FRIGHTENED, FROWN
TOTALSCORE: 2
BODYLANGUAGE: RELAXED
CONSOLABILITY: NO NEED TO CONSOLE
BREATHING: NORMAL

## 2024-12-07 ASSESSMENT — PAIN - FUNCTIONAL ASSESSMENT: PAIN_FUNCTIONAL_ASSESSMENT: PAINAD (PAIN ASSESSMENT IN ADVANCED DEMENTIA SCALE)

## 2024-12-07 ASSESSMENT — PAIN SCALES - GENERAL: PAINLEVEL_OUTOF10: 0 - NO PAIN

## 2024-12-07 NOTE — ED TRIAGE NOTES
Pt to ED from [facility] via EMS after being found by daughter on the ground. EMS reports that the daughter left on Monday and returned today finding her on the floor. Pt has dementia and is a dialysis pt. Pt's last dialysis tx was 11/26/24. Pt's daughter states pt has been refusing to go to dialysis and is interested in hospice care.

## 2024-12-08 ENCOUNTER — APPOINTMENT (OUTPATIENT)
Dept: DIALYSIS | Facility: HOSPITAL | Age: 79
End: 2024-12-08
Payer: MEDICARE

## 2024-12-08 ENCOUNTER — APPOINTMENT (OUTPATIENT)
Dept: RADIOLOGY | Facility: HOSPITAL | Age: 79
DRG: 640 | End: 2024-12-08
Payer: MEDICARE

## 2024-12-08 LAB
ALBUMIN SERPL BCP-MCNC: 2.8 G/DL (ref 3.4–5)
ANION GAP SERPL CALC-SCNC: 13 MMOL/L (ref 10–20)
BUN SERPL-MCNC: 23 MG/DL (ref 6–23)
CALCIUM SERPL-MCNC: 8.5 MG/DL (ref 8.6–10.3)
CHLORIDE SERPL-SCNC: 103 MMOL/L (ref 98–107)
CO2 SERPL-SCNC: 27 MMOL/L (ref 21–32)
CREAT SERPL-MCNC: 6.11 MG/DL (ref 0.5–1.05)
EGFRCR SERPLBLD CKD-EPI 2021: 6 ML/MIN/1.73M*2
ERYTHROCYTE [DISTWIDTH] IN BLOOD BY AUTOMATED COUNT: 16.8 % (ref 11.5–14.5)
GLUCOSE BLD MANUAL STRIP-MCNC: 104 MG/DL (ref 74–99)
GLUCOSE BLD MANUAL STRIP-MCNC: 119 MG/DL (ref 74–99)
GLUCOSE BLD MANUAL STRIP-MCNC: 125 MG/DL (ref 74–99)
GLUCOSE BLD MANUAL STRIP-MCNC: 176 MG/DL (ref 74–99)
GLUCOSE SERPL-MCNC: 134 MG/DL (ref 74–99)
HBV SURFACE AB SER-ACNC: >1000 MIU/ML
HBV SURFACE AG SERPL QL IA: NONREACTIVE
HCT VFR BLD AUTO: 30.8 % (ref 36–46)
HGB BLD-MCNC: 10.9 G/DL (ref 12–16)
MAGNESIUM SERPL-MCNC: 1.88 MG/DL (ref 1.6–2.4)
MCH RBC QN AUTO: 28 PG (ref 26–34)
MCHC RBC AUTO-ENTMCNC: 35.4 G/DL (ref 32–36)
MCV RBC AUTO: 79 FL (ref 80–100)
NRBC BLD-RTO: 0.4 /100 WBCS (ref 0–0)
PHOSPHATE SERPL-MCNC: 2.4 MG/DL (ref 2.5–4.9)
PLATELET # BLD AUTO: 178 X10*3/UL (ref 150–450)
POTASSIUM SERPL-SCNC: 3.5 MMOL/L (ref 3.5–5.3)
RBC # BLD AUTO: 3.89 X10*6/UL (ref 4–5.2)
SODIUM SERPL-SCNC: 139 MMOL/L (ref 136–145)
WBC # BLD AUTO: 7.6 X10*3/UL (ref 4.4–11.3)

## 2024-12-08 PROCEDURE — 2500000005 HC RX 250 GENERAL PHARMACY W/O HCPCS: Performed by: SURGERY

## 2024-12-08 PROCEDURE — 82947 ASSAY GLUCOSE BLOOD QUANT: CPT

## 2024-12-08 PROCEDURE — 85027 COMPLETE CBC AUTOMATED: CPT | Performed by: SURGERY

## 2024-12-08 PROCEDURE — 2500000004 HC RX 250 GENERAL PHARMACY W/ HCPCS (ALT 636 FOR OP/ED)

## 2024-12-08 PROCEDURE — 8010000001 HC DIALYSIS - HEMODIALYSIS PER DAY

## 2024-12-08 PROCEDURE — 83735 ASSAY OF MAGNESIUM: CPT | Performed by: SURGERY

## 2024-12-08 PROCEDURE — 86706 HEP B SURFACE ANTIBODY: CPT | Mod: AHULAB

## 2024-12-08 PROCEDURE — 99291 CRITICAL CARE FIRST HOUR: CPT

## 2024-12-08 PROCEDURE — 87340 HEPATITIS B SURFACE AG IA: CPT | Mod: AHULAB

## 2024-12-08 PROCEDURE — 36415 COLL VENOUS BLD VENIPUNCTURE: CPT

## 2024-12-08 PROCEDURE — 71045 X-RAY EXAM CHEST 1 VIEW: CPT

## 2024-12-08 PROCEDURE — 2020000001 HC ICU ROOM DAILY

## 2024-12-08 PROCEDURE — 71045 X-RAY EXAM CHEST 1 VIEW: CPT | Performed by: RADIOLOGY

## 2024-12-08 PROCEDURE — 36415 COLL VENOUS BLD VENIPUNCTURE: CPT | Performed by: SURGERY

## 2024-12-08 PROCEDURE — 99222 1ST HOSP IP/OBS MODERATE 55: CPT | Performed by: INTERNAL MEDICINE

## 2024-12-08 PROCEDURE — 80069 RENAL FUNCTION PANEL: CPT | Performed by: SURGERY

## 2024-12-08 PROCEDURE — 2500000005 HC RX 250 GENERAL PHARMACY W/O HCPCS

## 2024-12-08 RX ORDER — ACETAMINOPHEN 160 MG/5ML
650 SOLUTION ORAL EVERY 4 HOURS PRN
Status: DISCONTINUED | OUTPATIENT
Start: 2024-12-08 | End: 2024-12-10 | Stop reason: HOSPADM

## 2024-12-08 RX ORDER — NOREPINEPHRINE BITARTRATE/D5W 8 MG/250ML
0-.5 PLASTIC BAG, INJECTION (ML) INTRAVENOUS CONTINUOUS
Status: DISCONTINUED | OUTPATIENT
Start: 2024-12-08 | End: 2024-12-08

## 2024-12-08 RX ORDER — ACETAMINOPHEN 325 MG/1
650 TABLET ORAL EVERY 4 HOURS PRN
Status: DISCONTINUED | OUTPATIENT
Start: 2024-12-08 | End: 2024-12-10 | Stop reason: HOSPADM

## 2024-12-08 RX ORDER — PANTOPRAZOLE SODIUM 40 MG/1
40 TABLET, DELAYED RELEASE ORAL
Status: DISCONTINUED | OUTPATIENT
Start: 2024-12-08 | End: 2024-12-08

## 2024-12-08 RX ADMIN — Medication 8 L/MIN: at 08:00

## 2024-12-08 RX ADMIN — NOREPINEPHRINE BITARTRATE 0.02 MCG/KG/MIN: 8 INJECTION, SOLUTION INTRAVENOUS at 02:12

## 2024-12-08 RX ADMIN — Medication 1 EACH: at 05:31

## 2024-12-08 RX ADMIN — SODIUM CHLORIDE, POTASSIUM CHLORIDE, SODIUM LACTATE AND CALCIUM CHLORIDE 500 ML: 600; 310; 30; 20 INJECTION, SOLUTION INTRAVENOUS at 02:11

## 2024-12-08 ASSESSMENT — PAIN SCALES - PAIN ASSESSMENT IN ADVANCED DEMENTIA (PAINAD)
BODYLANGUAGE: RELAXED
BODYLANGUAGE: RELAXED
TOTALSCORE: 0
FACIALEXPRESSION: SMILING OR INEXPRESSIVE
CONSOLABILITY: NO NEED TO CONSOLE
BREATHING: NORMAL
BODYLANGUAGE: RELAXED
CONSOLABILITY: NO NEED TO CONSOLE
BREATHING: NORMAL
TOTALSCORE: 1
NEGVOCALIZATION: OCCASIONAL MOAN/GROAN, LOW SPEECH, NEGATIVE/DISAPPROVING QUALITY
CONSOLABILITY: NO NEED TO CONSOLE
FACIALEXPRESSION: SMILING OR INEXPRESSIVE
TOTALSCORE: 0
BREATHING: NORMAL
FACIALEXPRESSION: SMILING OR INEXPRESSIVE

## 2024-12-08 ASSESSMENT — PAIN - FUNCTIONAL ASSESSMENT
PAIN_FUNCTIONAL_ASSESSMENT: PAINAD (PAIN ASSESSMENT IN ADVANCED DEMENTIA SCALE)
PAIN_FUNCTIONAL_ASSESSMENT: PAINAD (PAIN ASSESSMENT IN ADVANCED DEMENTIA SCALE)
PAIN_FUNCTIONAL_ASSESSMENT: ADVANCED DEMENTIA
PAIN_FUNCTIONAL_ASSESSMENT: PAINAD (PAIN ASSESSMENT IN ADVANCED DEMENTIA SCALE)

## 2024-12-08 ASSESSMENT — COGNITIVE AND FUNCTIONAL STATUS - GENERAL
PERSONAL GROOMING: TOTAL
DAILY ACTIVITIY SCORE: 6
TURNING FROM BACK TO SIDE WHILE IN FLAT BAD: TOTAL
CLIMB 3 TO 5 STEPS WITH RAILING: TOTAL
WALKING IN HOSPITAL ROOM: TOTAL
DRESSING REGULAR UPPER BODY CLOTHING: TOTAL
STANDING UP FROM CHAIR USING ARMS: TOTAL
MOBILITY SCORE: 6
EATING MEALS: TOTAL
HELP NEEDED FOR BATHING: TOTAL
MOVING FROM LYING ON BACK TO SITTING ON SIDE OF FLAT BED WITH BEDRAILS: TOTAL
TOILETING: TOTAL
DRESSING REGULAR LOWER BODY CLOTHING: TOTAL
MOVING TO AND FROM BED TO CHAIR: TOTAL

## 2024-12-08 NOTE — H&P
History Of Present Illness  Hilda Troy is a 79 y.o. female with past medical history significant for ESRD on dialysis, MM, HTN, HLD, DM2, Dementia, Anemia, and diabetic retinopathy. She was admitted today as she was found down on floor by her daughter. Apparently her daughter was out of town for about a week and had not had her scheduled dialysis. On arrival to the ER she was hyperkalemic and was treated which reduced her potassium to 5.5. Additionally her Cr was 19.90 and BUN was 86. She was easily arousable however encephalopathic and A&Ox1 which is attributed to her uremia as well due multiple metabolic derangements from missing dialysis for nearly a week. Her imaging was negative for any acute fracture of the cs-spine or pelvis and negative for acute intracranial process. Nephrology was contacted and emergent dialysis was ordered. She is admitted to the ICU for further care.     Past Medical History  Past Medical History:   Diagnosis Date    Central retinal vein occlusion of right eye (CMS-HCC)     CRVO (central retinal vein occlusion), right    Maternal care for other specified fetal problems, unspecified trimester, fetus 5 ([facility]) 10/02/2015    Antepartum fetal acidosis, unspecified trimester, fetus 5    Maternal care for other specified fetal problems, unspecified trimester, fetus 5 ([facility]) 10/02/2015    Antepartum fetal acidosis, unspecified trimester, fetus 5    Presence of intraocular lens 10/02/2015    Pseudophakia of left eye    Presence of intraocular lens 10/02/2015    Pseudophakia of left eye    Unspecified cataract     Cataract, right eye       Surgical History  Past Surgical History:   Procedure Laterality Date    OTHER SURGICAL HISTORY  10/02/2015    Extracaps Cataract Extract With Prosthesis Insert Left Eye    OTHER SURGICAL HISTORY  10/02/2015    Proliferative Retinopathy Pan-Retinal Ablation Performed        Social History  She reports that she has never smoked. She has never used  "smokeless tobacco. She reports current alcohol use. She reports that she does not use drugs.    Family History  No family history on file.     Allergies  Patient has no known allergies.    Review of Systems   Reason unable to perform ROS: Encephalopathis/ AMS.        Physical Exam  Vitals reviewed.   Constitutional:       General: She is not in acute distress.  HENT:      Head: Normocephalic.      Nose: Nose normal.      Mouth/Throat:      Mouth: Mucous membranes are moist.      Pharynx: Oropharynx is clear.   Eyes:      Extraocular Movements: Extraocular movements intact.      Pupils: Pupils are equal, round, and reactive to light.   Cardiovascular:      Heart sounds: Normal heart sounds. No murmur heard.     No friction rub. No gallop.   Pulmonary:      Effort: Pulmonary effort is normal.      Comments: Diminished breath sounds bilaterally  Abdominal:      General: Bowel sounds are normal. There is no distension.      Palpations: Abdomen is soft.      Tenderness: There is no abdominal tenderness.   Musculoskeletal:         General: Normal range of motion.      Cervical back: Normal range of motion.      Right lower leg: No edema.      Left lower leg: No edema.   Skin:     Capillary Refill: Capillary refill takes less than 2 seconds.   Neurological:      Mental Status: She is alert. She is disoriented.      Comments: Oriented only to self          Last Recorded Vitals  Blood pressure 125/53, pulse 75, temperature 35.6 °C (96.1 °F), temperature source Temporal, resp. rate 14, height 1.702 m (5' 7\"), weight 56.5 kg (124 lb 9 oz), SpO2 99%.    Relevant Results    Current Facility-Administered Medications:     acetaminophen (Tylenol) oral liquid 650 mg, 650 mg, oral, q4h PRN **OR** acetaminophen (Tylenol) tablet 650 mg, 650 mg, oral, q4h PRN, Annel Ackerman, APRN-CNP    atorvastatin (Lipitor) tablet 80 mg, 80 mg, oral, Daily, Lana Vicente MD    dextrose 50 % injection 12.5 g, 12.5 g, intravenous, q15 min PRN, Lana MENDOZA" MD Jules    glucagon (Glucagen) injection 1 mg, 1 mg, intramuscular, q15 min PRN, Lana Vicente MD    heparin (porcine) injection 5,000 Units, 5,000 Units, subcutaneous, q8h PARISH, Lana Vicente MD, 5,000 Units at 12/07/24 2244    hydrALAZINE (Apresoline) tablet 200 mg, 200 mg, oral, Daily, Lana Vicente MD    insulin lispro injection 0-10 Units, 0-10 Units, subcutaneous, TID AC, Lana Vicente MD    metoprolol succinate XL (Toprol-XL) 24 hr tablet 200 mg, 200 mg, oral, Daily, Lana Vicente MD    NIFEdipine ER (Adalat CC) 24 hr tablet 30 mg, 30 mg, oral, Daily, Lana Vicente MD    oxygen (O2) therapy, , inhalation, Continuous - Inhalation, Lana Vicente MD    pantoprazole (ProtoNix) EC tablet 40 mg, 40 mg, oral, Daily before breakfast, Annel Ackerman, APRN-CNP    sevelamer carbonate (Renvela) tablet 800 mg, 800 mg, oral, Daily, Lana Vicente MD     Results for orders placed or performed during the hospital encounter of 12/07/24 (from the past 24 hours)   POCT GLUCOSE   Result Value Ref Range    POCT Glucose 105 (H) 74 - 99 mg/dL   Blood Gas Venous Full Panel   Result Value Ref Range    POCT pH, Venous 7.29 (L) 7.33 - 7.43 pH    POCT pCO2, Venous 40 (L) 41 - 51 mm Hg    POCT pO2, Venous 38 35 - 45 mm Hg    POCT SO2, Venous 61 45 - 75 %    POCT Oxy Hemoglobin, Venous 59.3 45.0 - 75.0 %    POCT Hematocrit Calculated, Venous 36.0 36.0 - 46.0 %    POCT Sodium, Venous 146 (H) 136 - 145 mmol/L    POCT Potassium, Venous 7.3 (HH) 3.5 - 5.3 mmol/L    POCT Chloride, Venous 115 (H) 98 - 107 mmol/L    POCT Ionized Calicum, Venous 1.31 1.10 - 1.33 mmol/L    POCT Glucose, Venous 111 (H) 74 - 99 mg/dL    POCT Lactate, Venous 1.7 0.4 - 2.0 mmol/L    POCT Base Excess, Venous -6.9 (L) -2.0 - 3.0 mmol/L    POCT HCO3 Calculated, Venous 19.2 (L) 22.0 - 26.0 mmol/L    POCT Hemoglobin, Venous 12.0 12.0 - 16.0 g/dL    POCT Anion Gap, Venous 19.0 10.0 - 25.0 mmol/L    Patient Temperature 37.0 degrees Celsius    FiO2 21 %   CBC  and Auto Differential   Result Value Ref Range    WBC 9.8 4.4 - 11.3 x10*3/uL    nRBC 0.0 0.0 - 0.0 /100 WBCs    RBC 4.09 4.00 - 5.20 x10*6/uL    Hemoglobin 11.4 (L) 12.0 - 16.0 g/dL    Hematocrit 34.6 (L) 36.0 - 46.0 %    MCV 85 80 - 100 fL    MCH 27.9 26.0 - 34.0 pg    MCHC 32.9 32.0 - 36.0 g/dL    RDW 17.2 (H) 11.5 - 14.5 %    Platelets 225 150 - 450 x10*3/uL    Neutrophils % 84.5 40.0 - 80.0 %    Immature Granulocytes %, Automated 0.4 0.0 - 0.9 %    Lymphocytes % 10.4 13.0 - 44.0 %    Monocytes % 4.3 2.0 - 10.0 %    Eosinophils % 0.3 0.0 - 6.0 %    Basophils % 0.1 0.0 - 2.0 %    Neutrophils Absolute 8.24 (H) 1.60 - 5.50 x10*3/uL    Immature Granulocytes Absolute, Automated 0.04 0.00 - 0.50 x10*3/uL    Lymphocytes Absolute 1.01 0.80 - 3.00 x10*3/uL    Monocytes Absolute 0.42 0.05 - 0.80 x10*3/uL    Eosinophils Absolute 0.03 0.00 - 0.40 x10*3/uL    Basophils Absolute 0.01 0.00 - 0.10 x10*3/uL   Comprehensive metabolic panel   Result Value Ref Range    Glucose 101 (H) 74 - 99 mg/dL    Sodium 146 (H) 136 - 145 mmol/L    Potassium 6.9 (HH) 3.5 - 5.3 mmol/L    Chloride 113 (H) 98 - 107 mmol/L    Bicarbonate 18 (L) 21 - 32 mmol/L    Anion Gap 22 (H) 10 - 20 mmol/L    Urea Nitrogen 86 (H) 6 - 23 mg/dL    Creatinine 19.90 (H) 0.50 - 1.05 mg/dL    eGFR 2 (L) >60 mL/min/1.73m*2    Calcium 9.7 8.6 - 10.3 mg/dL    Albumin 3.0 (L) 3.4 - 5.0 g/dL    Alkaline Phosphatase 44 33 - 136 U/L    Total Protein 8.6 (H) 6.4 - 8.2 g/dL    AST 14 9 - 39 U/L    Bilirubin, Total 0.5 0.0 - 1.2 mg/dL    ALT 7 7 - 45 U/L   Magnesium   Result Value Ref Range    Magnesium 2.74 (H) 1.60 - 2.40 mg/dL   B-Type Natriuretic Peptide   Result Value Ref Range     (H) 0 - 99 pg/mL   Troponin I, High Sensitivity   Result Value Ref Range    Troponin I, High Sensitivity 269 (HH) 0 - 13 ng/L   Ammonia   Result Value Ref Range    Ammonia 25 16 - 53 umol/L   Acute Toxicology Panel, Blood   Result Value Ref Range    Acetaminophen <10.0 10.0 - 30.0 ug/mL     Salicylate  <3 4 - 20 mg/dL    Alcohol <10 <=10 mg/dL   Influenza A, and B PCR   Result Value Ref Range    Flu A Result Not Detected Not Detected    Flu B Result Not Detected Not Detected   Sars-CoV-2 PCR   Result Value Ref Range    Coronavirus 2019, PCR Not Detected Not Detected   BLOOD GAS VENOUS FULL PANEL   Result Value Ref Range    POCT pH, Venous 7.26 (L) 7.33 - 7.43 pH    POCT pCO2, Venous 42 41 - 51 mm Hg    POCT pO2, Venous 37 35 - 45 mm Hg    POCT SO2, Venous 56 45 - 75 %    POCT Oxy Hemoglobin, Venous 54.8 45.0 - 75.0 %    POCT Hematocrit Calculated, Venous 37.0 36.0 - 46.0 %    POCT Sodium, Venous 147 (H) 136 - 145 mmol/L    POCT Potassium, Venous 6.1 (HH) 3.5 - 5.3 mmol/L    POCT Chloride, Venous 115 (H) 98 - 107 mmol/L    POCT Ionized Calicum, Venous 1.44 (H) 1.10 - 1.33 mmol/L    POCT Glucose, Venous 183 (H) 74 - 99 mg/dL    POCT Lactate, Venous 2.4 (H) 0.4 - 2.0 mmol/L    POCT Base Excess, Venous -7.9 (L) -2.0 - 3.0 mmol/L    POCT HCO3 Calculated, Venous 18.8 (L) 22.0 - 26.0 mmol/L    POCT Hemoglobin, Venous 12.2 12.0 - 16.0 g/dL    POCT Anion Gap, Venous 19.0 10.0 - 25.0 mmol/L    Patient Temperature 37.0 degrees Celsius    FiO2 21 %   Blood Culture    Specimen: Peripheral Venipuncture; Blood culture   Result Value Ref Range    Blood Culture Loaded on Instrument - Culture in progress    Blood Culture    Specimen: Peripheral Venipuncture; Blood culture   Result Value Ref Range    Blood Culture Loaded on Instrument - Culture in progress    Troponin I, High Sensitivity   Result Value Ref Range    Troponin I, High Sensitivity 257 (HH) 0 - 13 ng/L   BLOOD GAS VENOUS FULL PANEL   Result Value Ref Range    POCT pH, Venous 7.24 (LL) 7.33 - 7.43 pH    POCT pCO2, Venous 41 41 - 51 mm Hg    POCT pO2, Venous 44 35 - 45 mm Hg    POCT SO2, Venous 69 45 - 75 %    POCT Oxy Hemoglobin, Venous 67.4 45.0 - 75.0 %    POCT Hematocrit Calculated, Venous 35.0 (L) 36.0 - 46.0 %    POCT Sodium, Venous 145 136 - 145  mmol/L    POCT Potassium, Venous 5.5 (H) 3.5 - 5.3 mmol/L    POCT Chloride, Venous 114 (H) 98 - 107 mmol/L    POCT Ionized Calicum, Venous 1.40 (H) 1.10 - 1.33 mmol/L    POCT Glucose, Venous 261 (H) 74 - 99 mg/dL    POCT Lactate, Venous 3.8 (H) 0.4 - 2.0 mmol/L    POCT Base Excess, Venous -9.3 (L) -2.0 - 3.0 mmol/L    POCT HCO3 Calculated, Venous 17.6 (L) 22.0 - 26.0 mmol/L    POCT Hemoglobin, Venous 11.5 (L) 12.0 - 16.0 g/dL    POCT Anion Gap, Venous 19.0 10.0 - 25.0 mmol/L    Patient Temperature 37.0 degrees Celsius    FiO2 21 %   POCT GLUCOSE   Result Value Ref Range    POCT Glucose 143 (H) 74 - 99 mg/dL   POCT GLUCOSE   Result Value Ref Range    POCT Glucose 176 (H) 74 - 99 mg/dL         Assessment/Plan   Hilda Troy is a 79 y.o. female with past medical history significant for ESRD on dialysis, MM, HTN, HLD, DM2, Dementia, Anemia, and diabetic retinopathy. She was admitted today as she was found down on floor by her daughter. Apparently her daughter was out of town for about a week and she had not had her scheduled dialysis. On arrival to the ER she was hyperkalemic and was treated which reduced her potassium to 5.5. Additionally her Cr was 19.90 and BUN was 86. She was easily arousable however encephalopathic and A&Ox1 which is attributed to her uremia as well due multiple metabolic derangements from missing dialysis for nearly a week. Her imaging was negative for any acute fracture of the cs-spine or pelvis and negative for acute intracranial process. Nephrology was contacted and emergent dialysis was ordered. She is admitted to the ICU for further care.      Plan:     Neuro:  Hx of Dementia  Metabolic Encephalopathy  CT head negative for acute intracranial process  - Serial neuro and paina ssessments  - PRN tylenol for pain    CV:  Hx of HTN and HLD  - Continuous telemetry   - Resume home antihypertensives and Statin  - SubQ Heparin/ SCDs for DVT ppx    Pulm:  Currently oxygenating well on RA  -  Continuous pulse oximetry  - ICS    GI:  - Renal Diet  - PPI for GI ppx    Renal:  ESRD on hemodialysis M/W/F  Hyperkalemia  - Daily RFP  - Emergent dialysis this morning  - Resume Sevelamer  - Nephrology consulted    Endocrine:  Hx of DM2  - SSI AC/HS    Heme/Onc:  Hx of multiple myeloma (last treatment 4/2020) and anemia of chronic disease  - Daily CBC    MSK:  - PT/OT  - OOB to chair ambulation as able    ID:  Afebrile no s/s of infection  Bcx NGTD, UA pending  - Trend WBCs  - Monitor temps    ICU checklist:  Vent/O2: RA  Lines/ Devices: PIVs, AV Fistula  AntiBx: None  Diet: Renal  GI Prophylaxis: PPI  DVT Prophylaxis: SubQ Heparin/ SCDs  Code Status: FULL CODE  Dispo: Admit to ICU      This critically ill patient continues to be at-risk for clinically significant deterioration / failure due to the above mentioned dysfunctional, unstable organ systems.  I have personally identified and managed all complex critical care issues to prevent aforementioned clinical deterioration.  Critical care time is spent at bedside and/or the immediate area and has included, but is not limited to, the review of diagnostic tests, labs, radiographs, serial assessments of hemodynamics, respiratory status, ventilatory management, and family updates.   CRITICAL CARE TIME: 50 minutes     RIAN Bolaños-CNP

## 2024-12-08 NOTE — PRE-PROCEDURE NOTE
Report from Sending RN:    Report From: RN DI  Recent Surgery of Procedure: No  Baseline Level of Consciousness (LOC): alert and oriented x1  Oxygen Use: No  Type: n/a  Diabetic: No  Last BP Med Given Day of Dialysis: JuaquinAR  Last Pain Med Given: JuaquinAR  Lab Tests to be Obtained with Dialysis: Yes, hep B AG  Blood Transfusion to be Given During Dialysis: No  Available IV Access: Yes, Tio  Medications to be Administered During Dialysis: No  Continuous IV Infusion Running: No  Restraints on Currently or in the Last 24 Hours: No  Hand-Off Communication: patient arrived from ED for HD, missed tx. Altered mental status.  Dialysis Catheter Dressing: n/a  Last Dressing Change: n/a

## 2024-12-08 NOTE — NURSING NOTE
HD started 0130, experienced hypotension about 15mins into the procedure. 500cc bolus LR given. Levo started. HD completed @0436.  HD tech applied pressure for 45 post dialysis before bleeding stopped. Surgifoam was used on fistula site

## 2024-12-08 NOTE — NURSING NOTE
Nurse called HD center for patient, no answer, was on hold for about 10mins. Nursing supervisor informed. Unit secretary reached out and was told by the  that a message will be sent to HD center.

## 2024-12-08 NOTE — POST-PROCEDURE NOTE
Report to Receiving RN:    Report To: RN DI  Time Report Called: at bedside post HD  Hand-Off Communication: tx details, fluid removal   Complications During Treatment: Yes, hypotension  Ultrafiltration Treatment: No  Medications Administered During Dialysis: Yes, Tio  Blood Products Administered During Dialysis: No  Labs Sent During Dialysis: Yes, Tio  Heparin Drip Rate Changes: No  Dialysis Catheter Dressing: n/a  Last Dressing Change: n/a    BEULAH Ponce    Last Updated: 4:35 AM by ADINA LEAHY T

## 2024-12-08 NOTE — ED PROCEDURE NOTE
Procedure  Critical Care    Performed by: Judah Goode MD  Authorized by: Judah Goode MD    Critical care provider statement:     Critical care time (minutes):  33    Critical care time was exclusive of:  Separately billable procedures and treating other patients and teaching time    Critical care was necessary to treat or prevent imminent or life-threatening deterioration of the following conditions:  Renal failure and metabolic crisis    Critical care was time spent personally by me on the following activities:  Blood draw for specimens, development of treatment plan with patient or surrogate, discussions with consultants, evaluation of patient's response to treatment, examination of patient, ordering and performing treatments and interventions, ordering and review of laboratory studies, ordering and review of radiographic studies, pulse oximetry, re-evaluation of patient's condition and review of old charts             Judah Goode MD  12/07/24 [id number]

## 2024-12-08 NOTE — CONSULTS
CONSULT: NEPHROLOGY SERVICE    REASON FOR CONSULT: ESKD  Admit Date: 12/7/2024  1:54 PM       HPI: Patient is a 79 y.o. 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, who came with altered mental status, missed multiple HD session last on 11/27, per daughter she just want to stop dialysis, pt non verbal    Dialysis at [facility], Dr Valdez from [facility], F schedule    Past Medical History:   Diagnosis Date    Central retinal vein occlusion of right eye (CMS-HCC)     CRVO (central retinal vein occlusion), right    Maternal care for other specified fetal problems, unspecified trimester, fetus 5 ([facility]) 10/02/2015    Antepartum fetal acidosis, unspecified trimester, fetus 5    Maternal care for other specified fetal problems, unspecified trimester, fetus 5 ([facility]) 10/02/2015    Antepartum fetal acidosis, unspecified trimester, fetus 5    Presence of intraocular lens 10/02/2015    Pseudophakia of left eye    Presence of intraocular lens 10/02/2015    Pseudophakia of left eye    Unspecified cataract     Cataract, right eye     Allergies: Patient has no known allergies.     Past Surgical History:   Procedure Laterality Date    OTHER SURGICAL HISTORY  10/02/2015    Extracaps Cataract Extract With Prosthesis Insert Left Eye    OTHER SURGICAL HISTORY  10/02/2015    Proliferative Retinopathy Pan-Retinal Ablation Performed       No family history on file.    Social History  She reports that she has never smoked. She has never used smokeless tobacco. She reports current alcohol use. She reports that she does not use drugs.    Review of Systems  Unable to get     CURRENT HOSP MEDS:    Current Facility-Administered Medications:     acetaminophen (Tylenol) oral liquid 650 mg, 650 mg, oral, q4h PRN **OR** acetaminophen (Tylenol) tablet 650 mg, 650 mg, oral, q4h PRN, Annel Ackerman, APRN-CNP    atorvastatin (Lipitor) tablet 80 mg, 80 mg, oral, Daily, Lana Vicente MD    dextrose 50 % injection 12.5 g, 12.5 g, intravenous, q15 min  "PRN, Lana Vicente MD    glucagon (Glucagen) injection 1 mg, 1 mg, intramuscular, q15 min PRN, Lana Vicente MD    heparin (porcine) injection 5,000 Units, 5,000 Units, subcutaneous, q8h PARISH, Lana Vicente MD, 5,000 Units at 12/07/24 2244    hydrALAZINE (Apresoline) tablet 200 mg, 200 mg, oral, Daily, Lana Vicente MD    insulin lispro injection 0-10 Units, 0-10 Units, subcutaneous, TID AC, Lana Vicente MD    metoprolol succinate XL (Toprol-XL) 24 hr tablet 200 mg, 200 mg, oral, Daily, Lana Vicente MD    NIFEdipine ER (Adalat CC) 24 hr tablet 30 mg, 30 mg, oral, Daily, Lana Vicente MD    norepinephrine (Levophed) 8 mg in dextrose 5% 250 mL (0.032 mg/mL) infusion (premix), 0-0.5 mcg/kg/min, intravenous, Continuous, OTIS Bolaños, Last Rate: 2.12 mL/hr at 12/08/24 0544, 0.02 mcg/kg/min at 12/08/24 0544    oxygen (O2) therapy, , inhalation, Continuous - Inhalation, Lana Vicente MD, 8 L/min at 12/08/24 0800    pantoprazole (ProtoNix) EC tablet 40 mg, 40 mg, oral, Daily before breakfast, OTIS Bolaños    sevelamer carbonate (Renvela) tablet 800 mg, 800 mg, oral, Daily, Lana Vicente MD    surgicel nu-knit 3x4 (Hemostat) pad, , Topical, Once, OTIS Bolaños     PHYSICAL EXAM:  /72   Pulse (!) 118   Temp 36.2 °C (97.2 °F)   Resp 20   Ht 1.702 m (5' 7\")   Wt 55 kg (121 lb 4.1 oz)   SpO2 100%   BMI 18.99 kg/m²     Intake/Output Summary (Last 24 hours) at 12/8/2024 1050  Last data filed at 12/8/2024 0436  Gross per 24 hour   Intake 2350 ml   Output 228 ml   Net 2122 ml     Gen: open eyes, non verbal, NAD  Neck: No JVD  Cardiac: RRR  Resp: clear BS  Abd: Soft, non tender, +BS, non distended   Ext: No edema   Access: LUE AVF, pulsatile and aneurysmatic   Neuro: moves 4 ext  Peripheral Pulses: weak peripheral pulses.  Skin: Skin color, texture, turgor normal, no suspicious rashes or lesions.    LABS:   Results for orders placed or performed during the hospital " encounter of 12/07/24 (from the past 24 hours)   POCT GLUCOSE   Result Value Ref Range    POCT Glucose 105 (H) 74 - 99 mg/dL   Blood Gas Venous Full Panel   Result Value Ref Range    POCT pH, Venous 7.29 (L) 7.33 - 7.43 pH    POCT pCO2, Venous 40 (L) 41 - 51 mm Hg    POCT pO2, Venous 38 35 - 45 mm Hg    POCT SO2, Venous 61 45 - 75 %    POCT Oxy Hemoglobin, Venous 59.3 45.0 - 75.0 %    POCT Hematocrit Calculated, Venous 36.0 36.0 - 46.0 %    POCT Sodium, Venous 146 (H) 136 - 145 mmol/L    POCT Potassium, Venous 7.3 (HH) 3.5 - 5.3 mmol/L    POCT Chloride, Venous 115 (H) 98 - 107 mmol/L    POCT Ionized Calicum, Venous 1.31 1.10 - 1.33 mmol/L    POCT Glucose, Venous 111 (H) 74 - 99 mg/dL    POCT Lactate, Venous 1.7 0.4 - 2.0 mmol/L    POCT Base Excess, Venous -6.9 (L) -2.0 - 3.0 mmol/L    POCT HCO3 Calculated, Venous 19.2 (L) 22.0 - 26.0 mmol/L    POCT Hemoglobin, Venous 12.0 12.0 - 16.0 g/dL    POCT Anion Gap, Venous 19.0 10.0 - 25.0 mmol/L    Patient Temperature 37.0 degrees Celsius    FiO2 21 %   CBC and Auto Differential   Result Value Ref Range    WBC 9.8 4.4 - 11.3 x10*3/uL    nRBC 0.0 0.0 - 0.0 /100 WBCs    RBC 4.09 4.00 - 5.20 x10*6/uL    Hemoglobin 11.4 (L) 12.0 - 16.0 g/dL    Hematocrit 34.6 (L) 36.0 - 46.0 %    MCV 85 80 - 100 fL    MCH 27.9 26.0 - 34.0 pg    MCHC 32.9 32.0 - 36.0 g/dL    RDW 17.2 (H) 11.5 - 14.5 %    Platelets 225 150 - 450 x10*3/uL    Neutrophils % 84.5 40.0 - 80.0 %    Immature Granulocytes %, Automated 0.4 0.0 - 0.9 %    Lymphocytes % 10.4 13.0 - 44.0 %    Monocytes % 4.3 2.0 - 10.0 %    Eosinophils % 0.3 0.0 - 6.0 %    Basophils % 0.1 0.0 - 2.0 %    Neutrophils Absolute 8.24 (H) 1.60 - 5.50 x10*3/uL    Immature Granulocytes Absolute, Automated 0.04 0.00 - 0.50 x10*3/uL    Lymphocytes Absolute 1.01 0.80 - 3.00 x10*3/uL    Monocytes Absolute 0.42 0.05 - 0.80 x10*3/uL    Eosinophils Absolute 0.03 0.00 - 0.40 x10*3/uL    Basophils Absolute 0.01 0.00 - 0.10 x10*3/uL   Comprehensive metabolic  panel   Result Value Ref Range    Glucose 101 (H) 74 - 99 mg/dL    Sodium 146 (H) 136 - 145 mmol/L    Potassium 6.9 (HH) 3.5 - 5.3 mmol/L    Chloride 113 (H) 98 - 107 mmol/L    Bicarbonate 18 (L) 21 - 32 mmol/L    Anion Gap 22 (H) 10 - 20 mmol/L    Urea Nitrogen 86 (H) 6 - 23 mg/dL    Creatinine 19.90 (H) 0.50 - 1.05 mg/dL    eGFR 2 (L) >60 mL/min/1.73m*2    Calcium 9.7 8.6 - 10.3 mg/dL    Albumin 3.0 (L) 3.4 - 5.0 g/dL    Alkaline Phosphatase 44 33 - 136 U/L    Total Protein 8.6 (H) 6.4 - 8.2 g/dL    AST 14 9 - 39 U/L    Bilirubin, Total 0.5 0.0 - 1.2 mg/dL    ALT 7 7 - 45 U/L   Magnesium   Result Value Ref Range    Magnesium 2.74 (H) 1.60 - 2.40 mg/dL   B-Type Natriuretic Peptide   Result Value Ref Range     (H) 0 - 99 pg/mL   Troponin I, High Sensitivity   Result Value Ref Range    Troponin I, High Sensitivity 269 (HH) 0 - 13 ng/L   Ammonia   Result Value Ref Range    Ammonia 25 16 - 53 umol/L   Acute Toxicology Panel, Blood   Result Value Ref Range    Acetaminophen <10.0 10.0 - 30.0 ug/mL    Salicylate  <3 4 - 20 mg/dL    Alcohol <10 <=10 mg/dL   Influenza A, and B PCR   Result Value Ref Range    Flu A Result Not Detected Not Detected    Flu B Result Not Detected Not Detected   Sars-CoV-2 PCR   Result Value Ref Range    Coronavirus 2019, PCR Not Detected Not Detected   BLOOD GAS VENOUS FULL PANEL   Result Value Ref Range    POCT pH, Venous 7.26 (L) 7.33 - 7.43 pH    POCT pCO2, Venous 42 41 - 51 mm Hg    POCT pO2, Venous 37 35 - 45 mm Hg    POCT SO2, Venous 56 45 - 75 %    POCT Oxy Hemoglobin, Venous 54.8 45.0 - 75.0 %    POCT Hematocrit Calculated, Venous 37.0 36.0 - 46.0 %    POCT Sodium, Venous 147 (H) 136 - 145 mmol/L    POCT Potassium, Venous 6.1 (HH) 3.5 - 5.3 mmol/L    POCT Chloride, Venous 115 (H) 98 - 107 mmol/L    POCT Ionized Calicum, Venous 1.44 (H) 1.10 - 1.33 mmol/L    POCT Glucose, Venous 183 (H) 74 - 99 mg/dL    POCT Lactate, Venous 2.4 (H) 0.4 - 2.0 mmol/L    POCT Base Excess, Venous -7.9  (L) -2.0 - 3.0 mmol/L    POCT HCO3 Calculated, Venous 18.8 (L) 22.0 - 26.0 mmol/L    POCT Hemoglobin, Venous 12.2 12.0 - 16.0 g/dL    POCT Anion Gap, Venous 19.0 10.0 - 25.0 mmol/L    Patient Temperature 37.0 degrees Celsius    FiO2 21 %   Blood Culture    Specimen: Peripheral Venipuncture; Blood culture   Result Value Ref Range    Blood Culture Loaded on Instrument - Culture in progress    Blood Culture    Specimen: Peripheral Venipuncture; Blood culture   Result Value Ref Range    Blood Culture Loaded on Instrument - Culture in progress    Troponin I, High Sensitivity   Result Value Ref Range    Troponin I, High Sensitivity 257 (HH) 0 - 13 ng/L   BLOOD GAS VENOUS FULL PANEL   Result Value Ref Range    POCT pH, Venous 7.24 (LL) 7.33 - 7.43 pH    POCT pCO2, Venous 41 41 - 51 mm Hg    POCT pO2, Venous 44 35 - 45 mm Hg    POCT SO2, Venous 69 45 - 75 %    POCT Oxy Hemoglobin, Venous 67.4 45.0 - 75.0 %    POCT Hematocrit Calculated, Venous 35.0 (L) 36.0 - 46.0 %    POCT Sodium, Venous 145 136 - 145 mmol/L    POCT Potassium, Venous 5.5 (H) 3.5 - 5.3 mmol/L    POCT Chloride, Venous 114 (H) 98 - 107 mmol/L    POCT Ionized Calicum, Venous 1.40 (H) 1.10 - 1.33 mmol/L    POCT Glucose, Venous 261 (H) 74 - 99 mg/dL    POCT Lactate, Venous 3.8 (H) 0.4 - 2.0 mmol/L    POCT Base Excess, Venous -9.3 (L) -2.0 - 3.0 mmol/L    POCT HCO3 Calculated, Venous 17.6 (L) 22.0 - 26.0 mmol/L    POCT Hemoglobin, Venous 11.5 (L) 12.0 - 16.0 g/dL    POCT Anion Gap, Venous 19.0 10.0 - 25.0 mmol/L    Patient Temperature 37.0 degrees Celsius    FiO2 21 %   POCT GLUCOSE   Result Value Ref Range    POCT Glucose 143 (H) 74 - 99 mg/dL   POCT GLUCOSE   Result Value Ref Range    POCT Glucose 176 (H) 74 - 99 mg/dL   POCT GLUCOSE   Result Value Ref Range    POCT Glucose 125 (H) 74 - 99 mg/dL   Magnesium   Result Value Ref Range    Magnesium 1.88 1.60 - 2.40 mg/dL   CBC   Result Value Ref Range    WBC 7.6 4.4 - 11.3 x10*3/uL    nRBC 0.4 (H) 0.0 - 0.0 /100  WBCs    RBC 3.89 (L) 4.00 - 5.20 x10*6/uL    Hemoglobin 10.9 (L) 12.0 - 16.0 g/dL    Hematocrit 30.8 (L) 36.0 - 46.0 %    MCV 79 (L) 80 - 100 fL    MCH 28.0 26.0 - 34.0 pg    MCHC 35.4 32.0 - 36.0 g/dL    RDW 16.8 (H) 11.5 - 14.5 %    Platelets 178 150 - 450 x10*3/uL   Renal Function Panel   Result Value Ref Range    Glucose 134 (H) 74 - 99 mg/dL    Sodium 139 136 - 145 mmol/L    Potassium 3.5 3.5 - 5.3 mmol/L    Chloride 103 98 - 107 mmol/L    Bicarbonate 27 21 - 32 mmol/L    Anion Gap 13 10 - 20 mmol/L    Urea Nitrogen 23 6 - 23 mg/dL    Creatinine 6.11 (H) 0.50 - 1.05 mg/dL    eGFR 6 (L) >60 mL/min/1.73m*2    Calcium 8.5 (L) 8.6 - 10.3 mg/dL    Phosphorus 2.4 (L) 2.5 - 4.9 mg/dL    Albumin 2.8 (L) 3.4 - 5.0 g/dL   POCT GLUCOSE   Result Value Ref Range    POCT Glucose 119 (H) 74 - 99 mg/dL       DATA:   Diagnostic tests reviewed for today's visit:    Labs and meds    ASSESSMENT AND PLAN:  - ESKD on HD: had emergency HD on arrival, finishing early am today, euvolemic, good residual UO  MWF schedule  Pt seems to be interested in stopping HD, daughter wants to meet hospice and palliative  HD tomorrow but will hold till have a better idea about plans to continue dialysis, also with very prolong AVF bleeding after cannulation and pulsatile on exam, likely stenotic lesion present, if to continue HD will need a fistulogram  BP: controlled  Resolved hyperkalemia  Hb >10    Greatly appreciate the opportunity to assist in the care of this patient. Will continue to follow.     Signature: Vj Snyder MD  Division of Nephrology and Hypertension

## 2024-12-08 NOTE — SIGNIFICANT EVENT
Significant Event     GOC discussed with daughter, Jaye (patient's care giver).  Patient's ESRD is d/t multiple myeloma for which the patient has stopped chemotherapies 4 years ago. Per Jaye, the patient has expressed that she would not want ACLS, Intubation, or other aggressive therapies to prolong her life.  Patient 's encephalopathy prevents her from participating in discussion. Per Jaye, there is a disconnect between the patient's eldest daughter, Christal (who resides in [facility]) and the patient with respect the patient's current condition and goals of care.  Given, the risks of harm from invasive mechanical vent support, ACLS, Pressors, HD, and all other life prolonging therapies, will change code status to DNRCC. Will arrange for phone call with Shelly (who is at the patient's bedside).

## 2024-12-09 ENCOUNTER — APPOINTMENT (OUTPATIENT)
Dept: CARDIOLOGY | Facility: HOSPITAL | Age: 79
DRG: 640 | End: 2024-12-09
Payer: MEDICARE

## 2024-12-09 VITALS
OXYGEN SATURATION: 94 % | HEIGHT: 67 IN | DIASTOLIC BLOOD PRESSURE: 59 MMHG | WEIGHT: 120.5 LBS | SYSTOLIC BLOOD PRESSURE: 133 MMHG | HEART RATE: 111 BPM | RESPIRATION RATE: 22 BRPM | TEMPERATURE: 99.9 F | BODY MASS INDEX: 18.91 KG/M2

## 2024-12-09 PROBLEM — R06.02 SOB (SHORTNESS OF BREATH): Status: ACTIVE | Noted: 2022-12-01

## 2024-12-09 PROBLEM — N18.9 ANEMIA IN CHRONIC KIDNEY DISEASE: Status: ACTIVE | Noted: 2024-02-07

## 2024-12-09 PROBLEM — Z99.2 DEPENDENCE ON RENAL DIALYSIS (CMS-HCC): Status: ACTIVE | Noted: 2024-02-06

## 2024-12-09 PROBLEM — E11.3591: Status: ACTIVE | Noted: 2024-12-09

## 2024-12-09 PROBLEM — D63.1 ANEMIA IN CHRONIC KIDNEY DISEASE: Status: ACTIVE | Noted: 2024-02-07

## 2024-12-09 PROBLEM — Z99.2 HEMODIALYSIS PATIENT (CMS-HCC): Status: ACTIVE | Noted: 2019-11-14

## 2024-12-09 PROBLEM — I10 ESSENTIAL (PRIMARY) HYPERTENSION: Status: ACTIVE | Noted: 2024-02-07

## 2024-12-09 PROBLEM — N18.6 END-STAGE RENAL DISEASE ON HEMODIALYSIS (MULTI): Status: ACTIVE | Noted: 2024-02-07

## 2024-12-09 PROBLEM — R41.82 ALTERED MENTAL STATUS, UNSPECIFIED ALTERED MENTAL STATUS TYPE: Status: RESOLVED | Noted: 2024-12-07 | Resolved: 2024-12-09

## 2024-12-09 PROBLEM — Z99.2 END-STAGE RENAL DISEASE ON HEMODIALYSIS (MULTI): Status: ACTIVE | Noted: 2024-02-07

## 2024-12-09 PROBLEM — Z51.5 END OF LIFE CARE: Status: ACTIVE | Noted: 2024-12-09

## 2024-12-09 PROCEDURE — 99239 HOSP IP/OBS DSCHRG MGMT >30: CPT | Performed by: INTERNAL MEDICINE

## 2024-12-09 PROCEDURE — 93005 ELECTROCARDIOGRAM TRACING: CPT

## 2024-12-09 PROCEDURE — 99223 1ST HOSP IP/OBS HIGH 75: CPT | Performed by: NURSE PRACTITIONER

## 2024-12-09 RX ORDER — HYDROMORPHONE HYDROCHLORIDE 0.2 MG/ML
0.2 INJECTION INTRAMUSCULAR; INTRAVENOUS; SUBCUTANEOUS
Status: DISCONTINUED | OUTPATIENT
Start: 2024-12-09 | End: 2024-12-10 | Stop reason: HOSPADM

## 2024-12-09 ASSESSMENT — PAIN SCALES - PAIN ASSESSMENT IN ADVANCED DEMENTIA (PAINAD)
CONSOLABILITY: NO NEED TO CONSOLE
BREATHING: NORMAL
FACIALEXPRESSION: SMILING OR INEXPRESSIVE
CONSOLABILITY: NO NEED TO CONSOLE
TOTALSCORE: 0
CONSOLABILITY: NO NEED TO CONSOLE
BODYLANGUAGE: RELAXED
BODYLANGUAGE: RELAXED
BREATHING: NORMAL
TOTALSCORE: 0
FACIALEXPRESSION: SMILING OR INEXPRESSIVE
BODYLANGUAGE: RELAXED
FACIALEXPRESSION: SMILING OR INEXPRESSIVE
TOTALSCORE: 0
BREATHING: NORMAL
CONSOLABILITY: NO NEED TO CONSOLE
CONSOLABILITY: NO NEED TO CONSOLE
FACIALEXPRESSION: SMILING OR INEXPRESSIVE
BODYLANGUAGE: RELAXED
BODYLANGUAGE: RELAXED
BREATHING: NORMAL
FACIALEXPRESSION: SMILING OR INEXPRESSIVE
BREATHING: NORMAL
TOTALSCORE: 0
TOTALSCORE: 0

## 2024-12-09 ASSESSMENT — PAIN - FUNCTIONAL ASSESSMENT
PAIN_FUNCTIONAL_ASSESSMENT: UNABLE TO SELF-REPORT
PAIN_FUNCTIONAL_ASSESSMENT: PAINAD (PAIN ASSESSMENT IN ADVANCED DEMENTIA SCALE)
PAIN_FUNCTIONAL_ASSESSMENT: UNABLE TO SELF-REPORT
PAIN_FUNCTIONAL_ASSESSMENT: PAINAD (PAIN ASSESSMENT IN ADVANCED DEMENTIA SCALE)
PAIN_FUNCTIONAL_ASSESSMENT: PAINAD (PAIN ASSESSMENT IN ADVANCED DEMENTIA SCALE)

## 2024-12-09 ASSESSMENT — COGNITIVE AND FUNCTIONAL STATUS - GENERAL
PERSONAL GROOMING: TOTAL
EATING MEALS: TOTAL
HELP NEEDED FOR BATHING: TOTAL
DRESSING REGULAR LOWER BODY CLOTHING: TOTAL
TOILETING: TOTAL
MOVING FROM LYING ON BACK TO SITTING ON SIDE OF FLAT BED WITH BEDRAILS: TOTAL
DRESSING REGULAR UPPER BODY CLOTHING: TOTAL
STANDING UP FROM CHAIR USING ARMS: TOTAL
DAILY ACTIVITIY SCORE: 6
CLIMB 3 TO 5 STEPS WITH RAILING: TOTAL
TURNING FROM BACK TO SIDE WHILE IN FLAT BAD: TOTAL
WALKING IN HOSPITAL ROOM: TOTAL
MOBILITY SCORE: 6
MOVING TO AND FROM BED TO CHAIR: TOTAL

## 2024-12-09 ASSESSMENT — ACTIVITIES OF DAILY LIVING (ADL): LACK_OF_TRANSPORTATION: NO

## 2024-12-09 NOTE — PROCEDURES
Plans noted for hospice placement. Nephrology will follow peripherally. Please call with questions.

## 2024-12-09 NOTE — PROGRESS NOTES
Hilda Troy is a 79 y.o. female on day 2 of admission presenting with Altered mental status, unspecified altered mental status type.    Patient admitted from home with daughter for altered mental status, palliative care meeting held with family today and ultimate decision for hospice services was decided. Referral placed for [facility] and liaison to meet with family this afternoon.    DC: Hospice at home vs facility       12/09/24 1417   Discharge Planning   Living Arrangements Children   Support Systems Children   Type of Residence Private residence   Do you have animals or pets at home? No   Who is requesting discharge planning? Provider   Home or Post Acute Services None   Expected Discharge Disposition [facility]ospice[facility]   Does the patient need discharge transport arranged? Yes   RoundTrip coordination needed? Yes   Has discharge transport been arranged? No   Financial Resource Strain   How hard is it for you to pay for the very basics like food, housing, medical care, and heating? Not hard   Housing Stability   In the last 12 months, was there a time when you were not able to pay the mortgage or rent on time? N   In the past 12 months, how many times have you moved where you were living? 0   At any time in the past 12 months, were you homeless or living in a shelter (including now)? N   Transportation Needs   In the past 12 months, has lack of transportation kept you from medical appointments or from getting medications? no   In the past 12 months, has lack of transportation kept you from meetings, work, or from getting things needed for daily living? No   Patient Choice   Provider Choice list and CMS website (https://medicare.gov/care-compare#search) for post-acute Quality and Resource Measure Data were provided and reviewed with: Family   Patient / Family choosing to utilize agency / facility established prior to hospitalization Yes   Stroke Family Assessment   Stroke Family Assessment Needed No    Intensity of Service   Intensity of Service 0-30 min       Emelyn Mixon RN

## 2024-12-09 NOTE — RESEARCH NOTES
Artificial Intelligence Monitoring in Nursing (AIMS Nursing) Study    Principle Investigator - Dr. Ed Burgos  Research Coordinator - Juan M Contreras RN     Patient Name - Hilda Troy  Date - 12/9/2024 9:16 AM  Location - 4th Floor, ICU [facility]    Hilda Troy was approached by Juan M Contreras RN to talk about participating in the AIMS Nursing Study. The patient was not able to be approached, a research coordinator will come back at a later time. Study protocol was followed and patient was given study contact information.     Juan M Contreras RN

## 2024-12-09 NOTE — PROGRESS NOTES
PALLIATIVE CARE SOCIAL WORK NOTE     Hospice order received today. Pt is an 79 yr old female who presented 12/7 after having been found down in [facility]. She is here with her daughter (Jaye Luna [phone]). There is another daughter Christal who is in [facility]. Pt and Jaye are also from [place], but have been in [place] for a little bit due to the death of Jaye's son. Sounds as though Jaye has been in & out of town on business but pt has remained at the hotel. Pt has been refusing dialysis. For this reason, hospice is being consulted. [facility] has been [facility] to [facility] via [facility]. [facility]'s on-site RN Aisha is meeting with the family now. Will continue to follow as appropriate. Thank you.    MARILEE Magdaleno     Addendum 4:03pm [facility] has met with pt and daughter. Plan will be for transfer to [facility] this evening. Transport has been requested for 8:00 pm

## 2024-12-09 NOTE — CONSULTS
Inpatient consult to Palliative Care  Consult performed by: Ekta Pandya, RIAN-CNP  Consult ordered by: Jacob Rodrigues DO          Reason For Consult  Reason for Consult: communication / medical decision making     History Of Present Illness  Hilda Troy is a 79 y.o. female with past medical history of  HTN, ESRD on HD, DM, and HLD presented to our ED 12/8/24 with reports of AMS.  She had missed at least two HD sessions, was apparently on her own x one week.  Patient has hx of dementia.  Cr in Ed was 19.9 and BUN was 86. K was 7.3. She has been dialyzed now and labs less alarming.  Patient had also been refusing dialysis lately.     No HCPOA.  Patient encephalopathic and cannot provide direction to goals.  Patient has two daughters Jaye and Christal.  Jaye is primary caregiver.  She moved to [facility] from [place] in July of this year.  Both are in agreement with code status and hospice referral     Symptoms (0 - 10, Best to Worst)  [place] Symptom Assessment System  0-10 (Numeric) Pain Score: 0 - No pain       Personal/Social History  She reports that she has never smoked. She has never used smokeless tobacco. She reports current alcohol use. She reports that she does not use drugs.      Past Medical History  She has a past medical history of Central retinal vein occlusion of right eye (CMS-HCC), Maternal care for other specified fetal problems, unspecified trimester, fetus 5 ([facility]) (10/02/2015), Maternal care for other specified fetal problems, unspecified trimester, fetus 5 ([facility]) (10/02/2015), Presence of intraocular lens (10/02/2015), Presence of intraocular lens (10/02/2015), and Unspecified cataract.    Surgical History  She has a past surgical history that includes Other surgical history (10/02/2015) and Other surgical history (10/02/2015).     Family History  No family history on file.  Allergies  Patient has no known allergies.    Review of Systems   Unable to perform ROS: Mental status change       "  Physical Exam  Constitutional:       Appearance: She is ill-appearing.   HENT:      Head: Normocephalic and atraumatic.      Nose: Nose normal.      Mouth/Throat:      Mouth: Mucous membranes are moist.      Pharynx: Oropharynx is clear.   Eyes:      Conjunctiva/sclera: Conjunctivae normal.      Pupils: Pupils are equal, round, and reactive to light.   Cardiovascular:      Rate and Rhythm: Normal rate and regular rhythm.      Heart sounds: Normal heart sounds.   Pulmonary:      Effort: Pulmonary effort is normal.      Breath sounds: Normal breath sounds.   Abdominal:      General: Abdomen is flat. Bowel sounds are normal.      Palpations: Abdomen is soft.   Genitourinary:     Comments: deferred  Musculoskeletal:         General: No swelling. Normal range of motion.      Cervical back: Normal range of motion.   Skin:     General: Skin is warm and dry.      Capillary Refill: Capillary refill takes 2 to 3 seconds.   Neurological:      Comments: Unresponsive to voice or gentle touch   Psychiatric:      Comments: Appears calm         Last Recorded Vitals  Blood pressure 129/57, pulse 92, temperature 36.9 °C (98.4 °F), resp. rate 21, height 1.702 m (5' 7\"), weight 54.7 kg (120 lb 8 oz), SpO2 96%.    Relevant Results  ECG 12 lead    Result Date: 12/9/2024  Normal sinus rhythm Biatrial enlargement Left anterior fascicular block Minimal voltage criteria for LVH, may be normal variant ( [place] product ) Possible Anterior infarct (cited on or before 08-JUL-2024) Abnormal ECG When compared with ECG of 08-JUL-2024 10:17, Nonspecific T wave abnormality no longer evident in Inferior leads T wave amplitude has increased in Anterior leads T wave inversion now evident in Lateral leads    XR chest 1 view    Result Date: 12/8/2024  Interpreted By:  Kevin Selby, STUDY: XR CHEST 1 VIEW;  12/8/2024 8:10 am   INDICATION: Signs/Symptoms:hypoxia.     COMPARISON: Portable chest, 7 December 2024   ACCESSION NUMBER(S): [id number]   " ORDERING CLINICIAN: JULIETA JOY   TECHNIQUE: Single frontal view of the chest; Portable technique   FINDINGS: Enlarged cardiac silhouette is unchanged   Lungs remain clear       NO ACUTE DISEASE IN THE CHEST   MACRO: None   Signed by: Kevin Selby 12/8/2024 9:25 AM Dictation workstation:   [id number]    CT chest abdomen pelvis w IV contrast    Result Date: 12/7/2024  Interpreted By:  Parish Diaz, STUDY: CT CHEST ABDOMEN PELVIS W IV CONTRAST;  12/7/2024 6:02 pm   INDICATION: Signs/Symptoms:AMS, found down.   COMPARISON: None.   ACCESSION NUMBER(S): [id number]   ORDERING CLINICIAN: JODIE BORJA   TECHNIQUE: Contiguous axial images of the chest, abdomen and pelvis were obtained after the intravenous administration of  contrast. Coronal and sagittal reformatted images were obtained from the axial images.   FINDINGS: CT CHEST:   No axillary lymphadenopathy. 9 mm and 15 mm x 7 mm paratracheal lymph nodes. 10 mm right hilar lymph node.   The heart is borderline in size. Coronary artery atherosclerotic calcifications. No significant pericardial effusion.   Mild bilateral subsegmental atelectasis. Nonspecific 4 mm pulmonary nodule in the right lung near the minor fissure. No significant pleural effusion. No pneumothorax.   Multilevel degenerative change of the thoracic spine.   CT ABDOMEN PELVIS:   No evidence of liver mass. There is cholelithiasis. No significant dilatation of the common bile duct.   The pancreas and spleen appear unremarkable.   Mild nodular thickening of the left adrenal gland. The right adrenal gland appears unremarkable.   There is atrophy of the bilateral kidneys. 2 cm cystic lesion in the lower pole the left kidney. No hydronephrosis.   The stomach is underdistended and not well evaluated. There is underdistention versus gastric wall thickening. No evidence of bowel obstruction or acute appendicitis.   Underdistention versus urinary bladder wall thickening.   Limited evaluation of the uterus and  adnexa. Calcification in the uterus compatible with fibroids.   Multilevel degenerative change of the lumbar spine.       No airspace consolidation or pleural effusion.   Underdistention versus gastric wall thickening and gastritis is not excluded.   Cholelithiasis.   Underdistention versus urinary bladder wall thickening; please correlate with urinalysis to evaluate for cystitis.   MACRO: None   Signed by: Parish Diaz 12/7/2024 6:46 PM Dictation workstation:   [id number]    CT cervical spine wo IV contrast    Result Date: 12/7/2024  Interpreted By:  Parish Diaz, STUDY: CT CERVICAL SPINE WO IV CONTRAST;  12/7/2024 6:02 pm   INDICATION: Signs/Symptoms:AMS, found down.   COMPARISON: None.   ACCESSION NUMBER(S): [id number]   ORDERING CLINICIAN: JODIE BORJA   TECHNIQUE: Contiguous axial images of the cervical spine were obtained without intravenous contrast. Coronal and sagittal reformatted images were obtained from the axial images.   FINDINGS: No acute fracture of the cervical spine. There is multilevel degenerative change of the cervical spine. There is multilevel intervertebral disc space narrowing and anterior osseous spurring. There is limited evaluation of the soft tissues of the spinal canal. There is multilevel posterior osseous spurring and disc protrusion. There is multilevel degenerative facet and uncovertebral arthropathy. No significant prevertebral soft tissue edema.       No evidence of acute fracture of the cervical spine.   Multilevel degenerative change of the cervical spine.   MACRO: None   Signed by: Parish Diaz 12/7/2024 6:37 PM Dictation workstation:   [id number]    CT head wo IV contrast    Result Date: 12/7/2024  Interpreted By:  Parish Diaz, STUDY: CT HEAD WO IV CONTRAST;  12/7/2024 6:02 pm   INDICATION: Signs/Symptoms:AMS, hx of ESRD.   COMPARISON: None   ACCESSION NUMBER(S): [id number]   ORDERING CLINICIAN: JODIE BORJA   TECHNIQUE: Contiguous axial images of the head were  obtained without intravenous contrast.   FINDINGS: BRAIN PARENCHYMA:  There is cerebral atrophy and chronic periventricular white matter small vessel ischemic change. The gray white matter differentiation is preserved.  No mass effect or midline shift.   HEMORRHAGE:  No evidence of acute intracranial hemorrhage. VENTRICLES AND EXTRA-AXIAL SPACES:  The ventricles are within normal limits in size for brain volume.  No evidence of abnormal extraaxial fluid collection. EXTRACRANIAL SOFT TISSUES:  Within normal limits. PARANASAL SINUSES/MASTOIDS:  The visualized paranasal sinuses and mastoid air cells are clear and well pneumatized. CALVARIUM:  No evidence of depressed calvarial fracture.   OTHER FINDINGS:  None       Cerebral atrophy and chronic periventricular white matter small vessel ischemic change.   No evidence of acute intracranial hemorrhage.       MACRO: None   Signed by: Parish Diaz 12/7/2024 6:36 PM Dictation workstation:   [id number]    XR pelvis 1-2 views    Result Date: 12/7/2024  Interpreted By:  Savanna Monterroso, STUDY: XR PELVIS 1-2 VIEWS; ;  12/7/2024 2:30 pm   INDICATION: Signs/Symptoms:fall, left hip deformity.     COMPARISON: None.   ACCESSION NUMBER(S): [id number]   ORDERING CLINICIAN: JODIE BORJA   FINDINGS: AP view of the pelvis was obtained. Bones are osteopenic. No fracture is identified. A calcification adjacent to the right lesser trochanter is probably a tendon calcification. There is mild arthritis in both hips. Sacroiliac joints are symmetric and symphysis pubis is normal in width. Diffuse vascular calcifications are present. Popcorn like calcifications to the right of midline in the pelvis may represent small uterine fibroids.       Osteopenia with no acute fracture identified     MACRO: None   Signed by: Savanna Monterroso 12/7/2024 3:08 PM Dictation workstation:   [id number]    XR chest 1 view    Result Date: 12/7/2024  Interpreted By:  Savanna Monterroso, STUDY: XR CHEST 1 VIEW;   12/7/2024 2:30 pm   INDICATION: Signs/Symptoms:fall, missed dialysis.     COMPARISON: None.   ACCESSION NUMBER(S): [id number]   ORDERING CLINICIAN: JODIE BORJA   TECHNIQUE: Portable AP supine   FINDINGS: The cardiac silhouette is normal size. Lungs are clear. No pleural effusion is noted. Bones are osteopenic with no acute abnormality noted.       No acute radiographic abnormality   MACRO: None.   Signed by: Savanna Monterroso 12/7/2024 3:06 PM Dictation workstation:   [id number]       Results for orders placed or performed during the hospital encounter of 12/07/24 (from the past 24 hours)   POCT GLUCOSE   Result Value Ref Range    POCT Glucose 104 (H) 74 - 99 mg/dL      Scheduled medications  sevelamer carbonate, 800 mg, oral, Daily  surgicel nu-knit 3x4, , Topical, Once      Continuous medications     PRN medications  PRN medications: acetaminophen **OR** acetaminophen, dextrose, glucagon     Assessment/Plan      Hilda Troy is a 79 y.o. female with past medical history of  HTN, ESRD on HD, DM, and HLD presented to our ED 12/8/24 with reports of AMS.  She had missed at least two HD sessions, was apparently on her own x one week.  Patient has hx of dementia.  Cr in Ed was 19.9 and BUN was 86. K was 7.3. She has been dialyzed now and labs less alarming.  Patient had also been refusing dialysis lately.   Palliative medicine consulted for goals of care    Goals of care:  family already in agreement with DNRCC and hospice placement  -confirmed goals with family, will place [facility] Portable DNRCC at bedside for transport  -palliative care SW to work on hospice referral    Sx management:  at risk for pain and dyspnea>  Lungs CTA but risk for aspiration due to encelopathy  Pain related to immobility  -adding hydromorphone 0.2 mg Q 4 hours PRN pain or dyspnea    Metabolic encephalopathy:  unresponsive to voice or gentle touch.  Does not appear to be distressed  -suggest low dose haloperidol 0.5 mg Q 6 hours PRN if she  becomes distressed/agitated       Ekta Pandya, APRN-CNP

## 2024-12-10 NOTE — NURSING NOTE
Pt d/c'd to hospice house, transferred via [facility]. Report given to transport. Pt's daughter to follow pt to facility, all belongings taken with daughter.

## 2024-12-11 LAB
ATRIAL RATE: 95 BPM
P AXIS: 75 DEGREES
P OFFSET: 180 MS
P ONSET: 132 MS
PR INTERVAL: 176 MS
Q ONSET: 220 MS
QRS COUNT: 16 BEATS
QRS DURATION: 76 MS
QT INTERVAL: 366 MS
QTC CALCULATION(BAZETT): 459 MS
QTC FREDERICIA: 426 MS
R AXIS: -52 DEGREES
T AXIS: 83 DEGREES
T OFFSET: 403 MS
VENTRICULAR RATE: 95 BPM

## 2024-12-12 LAB
BACTERIA BLD CULT: NORMAL
BACTERIA BLD CULT: NORMAL